# Patient Record
Sex: MALE | Race: WHITE | NOT HISPANIC OR LATINO | Employment: FULL TIME | URBAN - NONMETROPOLITAN AREA
[De-identification: names, ages, dates, MRNs, and addresses within clinical notes are randomized per-mention and may not be internally consistent; named-entity substitution may affect disease eponyms.]

---

## 2023-01-23 PROBLEM — C61 MALIGNANT NEOPLASM OF PROSTATE (MULTI): Status: ACTIVE | Noted: 2023-01-23

## 2023-01-23 PROBLEM — M19.072 PRIMARY OSTEOARTHRITIS OF BOTH ANKLES: Status: ACTIVE | Noted: 2023-01-23

## 2023-01-23 PROBLEM — Z90.79 STATUS POST PROSTATECTOMY: Status: ACTIVE | Noted: 2023-01-23

## 2023-01-23 PROBLEM — E78.5 HYPERLIPIDEMIA: Status: ACTIVE | Noted: 2023-01-23

## 2023-01-23 PROBLEM — M20.10 HALLUX VALGUS WITH BUNIONS: Status: ACTIVE | Noted: 2023-01-23

## 2023-01-23 PROBLEM — G93.0 ARACHNOID CYST: Status: ACTIVE | Noted: 2023-01-23

## 2023-01-23 PROBLEM — G47.33 OSA ON CPAP: Status: ACTIVE | Noted: 2023-01-23

## 2023-01-23 PROBLEM — M19.071 PRIMARY OSTEOARTHRITIS OF BOTH ANKLES: Status: ACTIVE | Noted: 2023-01-23

## 2023-01-23 PROBLEM — M19.079 1ST MTP ARTHRITIS: Status: ACTIVE | Noted: 2023-01-23

## 2023-01-23 PROBLEM — E66.3 OVERWEIGHT: Status: ACTIVE | Noted: 2023-01-23

## 2023-01-23 PROBLEM — M47.816 OSTEOARTHRITIS OF LUMBAR SPINE: Status: ACTIVE | Noted: 2023-01-23

## 2023-01-23 PROBLEM — E55.9 VITAMIN D DEFICIENCY: Status: ACTIVE | Noted: 2023-01-23

## 2023-01-23 PROBLEM — M79.89 SWELLING OF LOWER LEG: Status: ACTIVE | Noted: 2023-01-23

## 2023-01-23 PROBLEM — N39.3 MALE STRESS INCONTINENCE: Status: ACTIVE | Noted: 2023-01-23

## 2023-01-23 PROBLEM — M21.619 HALLUX VALGUS WITH BUNIONS: Status: ACTIVE | Noted: 2023-01-23

## 2023-01-23 PROBLEM — L24.9 IRRITANT CONTACT DERMATITIS: Status: ACTIVE | Noted: 2023-01-23

## 2023-01-23 PROBLEM — H53.9 VISION ABNORMALITIES: Status: ACTIVE | Noted: 2023-01-23

## 2023-01-23 PROBLEM — R60.0 BILATERAL EDEMA OF LOWER EXTREMITY: Status: ACTIVE | Noted: 2023-01-23

## 2023-01-23 PROBLEM — E66.01 SEVERE OBESITY (BMI 35.0-39.9) WITH COMORBIDITY (MULTI): Status: ACTIVE | Noted: 2023-01-23

## 2023-01-23 PROBLEM — M19.90 ARTHRITIS: Status: ACTIVE | Noted: 2023-01-23

## 2023-01-23 PROBLEM — I10 HYPERTENSION: Status: ACTIVE | Noted: 2023-01-23

## 2023-01-23 PROBLEM — R00.1 BRADYCARDIA: Status: ACTIVE | Noted: 2023-01-23

## 2023-01-23 PROBLEM — N52.34 ERECTILE DYSFUNCTION FOLLOWING SIMPLE PROSTATECTOMY: Status: ACTIVE | Noted: 2023-01-23

## 2023-01-23 RX ORDER — VIT C/E/CUPERIC/ZINC/LUTEIN 226-90-0.8
CAPSULE ORAL
COMMUNITY
Start: 2022-07-06

## 2023-01-23 RX ORDER — CHOLECALCIFEROL (VITAMIN D3) 25 MCG
1 TABLET ORAL DAILY
COMMUNITY
Start: 2021-01-28

## 2023-01-23 RX ORDER — MULTIVITAMIN
TABLET ORAL
COMMUNITY
Start: 2020-07-16 | End: 2023-01-23 | Stop reason: ENTERED-IN-ERROR

## 2023-01-23 RX ORDER — FUROSEMIDE 40 MG/1
1 TABLET ORAL DAILY
COMMUNITY
Start: 2022-06-28 | End: 2023-06-08 | Stop reason: SDUPTHER

## 2023-01-23 RX ORDER — ZINC OXIDE 25 %
PASTE (GRAM) TOPICAL
COMMUNITY
Start: 2022-06-08 | End: 2023-03-07 | Stop reason: ALTCHOICE

## 2023-01-23 RX ORDER — ETODOLAC 200 MG/1
1 CAPSULE ORAL DAILY
COMMUNITY
Start: 2019-09-20

## 2023-01-23 RX ORDER — LISINOPRIL 20 MG/1
1 TABLET ORAL DAILY
COMMUNITY
Start: 2022-06-28 | End: 2023-05-24

## 2023-01-23 RX ORDER — ATORVASTATIN CALCIUM 40 MG/1
1 TABLET, FILM COATED ORAL DAILY
COMMUNITY
Start: 2021-08-04 | End: 2023-03-07 | Stop reason: SDUPTHER

## 2023-03-07 ENCOUNTER — OFFICE VISIT (OUTPATIENT)
Dept: PRIMARY CARE | Facility: CLINIC | Age: 68
End: 2023-03-07
Payer: MEDICARE

## 2023-03-07 VITALS
HEIGHT: 68 IN | BODY MASS INDEX: 35.8 KG/M2 | RESPIRATION RATE: 18 BRPM | TEMPERATURE: 97.5 F | HEART RATE: 76 BPM | DIASTOLIC BLOOD PRESSURE: 72 MMHG | WEIGHT: 236.2 LBS | SYSTOLIC BLOOD PRESSURE: 145 MMHG | OXYGEN SATURATION: 97 %

## 2023-03-07 DIAGNOSIS — G93.0 ARACHNOID CYST: ICD-10-CM

## 2023-03-07 DIAGNOSIS — E55.9 VITAMIN D DEFICIENCY: ICD-10-CM

## 2023-03-07 DIAGNOSIS — Z00.00 HEALTH CARE MAINTENANCE: Primary | ICD-10-CM

## 2023-03-07 DIAGNOSIS — M19.072 PRIMARY OSTEOARTHRITIS OF BOTH ANKLES: ICD-10-CM

## 2023-03-07 DIAGNOSIS — C61 MALIGNANT NEOPLASM OF PROSTATE (MULTI): ICD-10-CM

## 2023-03-07 DIAGNOSIS — G47.33 OSA ON CPAP: ICD-10-CM

## 2023-03-07 DIAGNOSIS — I10 PRIMARY HYPERTENSION: ICD-10-CM

## 2023-03-07 DIAGNOSIS — M79.89 SWELLING OF LOWER LEG: ICD-10-CM

## 2023-03-07 DIAGNOSIS — M19.071 PRIMARY OSTEOARTHRITIS OF BOTH ANKLES: ICD-10-CM

## 2023-03-07 DIAGNOSIS — R60.0 BILATERAL EDEMA OF LOWER EXTREMITY: ICD-10-CM

## 2023-03-07 DIAGNOSIS — E78.5 HYPERLIPIDEMIA, UNSPECIFIED HYPERLIPIDEMIA TYPE: ICD-10-CM

## 2023-03-07 PROBLEM — M19.90 ARTHRITIS: Status: RESOLVED | Noted: 2023-01-23 | Resolved: 2023-03-07

## 2023-03-07 PROBLEM — M79.605 BILATERAL LEG AND FOOT PAIN: Status: RESOLVED | Noted: 2023-03-07 | Resolved: 2023-03-07

## 2023-03-07 PROBLEM — L24.9 IRRITANT CONTACT DERMATITIS: Status: RESOLVED | Noted: 2023-01-23 | Resolved: 2023-03-07

## 2023-03-07 PROBLEM — L03.116 CELLULITIS OF LEFT LOWER LEG: Status: RESOLVED | Noted: 2023-03-07 | Resolved: 2023-03-07

## 2023-03-07 PROBLEM — M79.671 BILATERAL LEG AND FOOT PAIN: Status: RESOLVED | Noted: 2023-03-07 | Resolved: 2023-03-07

## 2023-03-07 PROBLEM — M79.672 BILATERAL LEG AND FOOT PAIN: Status: RESOLVED | Noted: 2023-03-07 | Resolved: 2023-03-07

## 2023-03-07 PROBLEM — B35.3 TINEA PEDIS OF LEFT FOOT: Status: RESOLVED | Noted: 2023-03-07 | Resolved: 2023-03-07

## 2023-03-07 PROBLEM — M21.619 HALLUX VALGUS WITH BUNIONS: Status: RESOLVED | Noted: 2023-01-23 | Resolved: 2023-03-07

## 2023-03-07 PROBLEM — R00.1 BRADYCARDIA: Status: RESOLVED | Noted: 2023-01-23 | Resolved: 2023-03-07

## 2023-03-07 PROBLEM — R05.3 PERSISTENT DRY COUGH: Status: RESOLVED | Noted: 2023-03-07 | Resolved: 2023-03-07

## 2023-03-07 PROBLEM — E66.3 OVERWEIGHT: Status: RESOLVED | Noted: 2023-01-23 | Resolved: 2023-03-07

## 2023-03-07 PROBLEM — H53.9 VISION ABNORMALITIES: Status: RESOLVED | Noted: 2023-01-23 | Resolved: 2023-03-07

## 2023-03-07 PROBLEM — R21 BLISTERING ERUPTION: Status: RESOLVED | Noted: 2023-03-07 | Resolved: 2023-03-07

## 2023-03-07 PROBLEM — M19.079 1ST MTP ARTHRITIS: Status: RESOLVED | Noted: 2023-01-23 | Resolved: 2023-03-07

## 2023-03-07 PROBLEM — R21 RASH/SKIN ERUPTION: Status: RESOLVED | Noted: 2023-03-07 | Resolved: 2023-03-07

## 2023-03-07 PROBLEM — M20.10 HALLUX VALGUS WITH BUNIONS: Status: RESOLVED | Noted: 2023-01-23 | Resolved: 2023-03-07

## 2023-03-07 PROBLEM — M79.604 BILATERAL LEG AND FOOT PAIN: Status: RESOLVED | Noted: 2023-03-07 | Resolved: 2023-03-07

## 2023-03-07 PROCEDURE — 99214 OFFICE O/P EST MOD 30 MIN: CPT | Performed by: INTERNAL MEDICINE

## 2023-03-07 PROCEDURE — 99397 PER PM REEVAL EST PAT 65+ YR: CPT | Performed by: INTERNAL MEDICINE

## 2023-03-07 PROCEDURE — 1159F MED LIST DOCD IN RCRD: CPT | Performed by: INTERNAL MEDICINE

## 2023-03-07 PROCEDURE — 3078F DIAST BP <80 MM HG: CPT | Performed by: INTERNAL MEDICINE

## 2023-03-07 PROCEDURE — 3077F SYST BP >= 140 MM HG: CPT | Performed by: INTERNAL MEDICINE

## 2023-03-07 PROCEDURE — 1160F RVW MEDS BY RX/DR IN RCRD: CPT | Performed by: INTERNAL MEDICINE

## 2023-03-07 PROCEDURE — 1036F TOBACCO NON-USER: CPT | Performed by: INTERNAL MEDICINE

## 2023-03-07 RX ORDER — ATORVASTATIN CALCIUM 40 MG/1
40 TABLET, FILM COATED ORAL DAILY
Qty: 90 TABLET | Refills: 3 | Status: SHIPPED | OUTPATIENT
Start: 2023-03-07 | End: 2024-03-06

## 2023-03-07 ASSESSMENT — PAIN SCALES - GENERAL: PAINLEVEL: 0-NO PAIN

## 2023-03-07 NOTE — PROGRESS NOTES
Subjective   Patient ID:   Federico Thorpe is a 68 y.o. male, history of HTN, HLD, OA, hx of prostate cancer s/p prostatectomy, FLAKITA, who presents for ROUTINE FOLLOW UP     HTN  - on lisinopril 20 mg qd  - on furosemide 40 mg qd      HLD  - on atorvastatin 40 mg qd      Osteoarthritis, mostly the left ankles, occasionally the right as well   - last x-ray was in ~2012, pain unchanged since then   - on etodolac 200 mg to 400 mg PRN, typically qd      Hx of prostate cancer  - hx of adenocarcinoma of the prostate, diagnosed in 2008  - s/p prostatectomy on 8/5/2008, stage [id number], grade group 1, stage group IIa, rising PSA at the time  - s/p PET CT on 5/21: - no evidence of neoplastic occurrence on prostate bed  - patient was referred to Dr. Serrano, s/p salvage radiation at [facility], finished 3/2021  - patient getting 1 injection of Androgen Deprivation therapy - Eligard 45mg , s/p 1/28/21  - prior urologist quit - Dr. Chacon at [facility], currently followed by [facility] , last seen 2/2022, follow up in 6 months   - PSA has been normal since 2013  - currently off Tamsulosin 0.4 mg qd   - have been off oxybutynin, as per patient c/o dizziness and lightheadedness  - was referred to Dr. Jaime Samayoa for artificial urinary spincher, however patient declined the procedure     FLAKITA  - patient is on CPAP therapy at home  - currently have FLAKITA symptoms that improved , however appeared that patient have issues with CPAP machine,   - RVSP elevated on last echo 4/2021  - follows by sleep medicine, Dr. Jenn Meeks, haven't seen yet  - s/p sleep study 2/2023     All other (10) organ systems have been reviewed, negative for significant complaint and no change from baseline other than mentioned as per HPI above.    Patient Active Problem List   Diagnosis    Arachnoid cyst    Erectile dysfunction following simple prostatectomy    Hyperlipidemia    Hypertension    Male stress incontinence    Malignant neoplasm of prostate (CMS/HCC)     FLAKITA on CPAP    Osteoarthritis of lumbar spine    Primary osteoarthritis of both ankles    Status post prostatectomy    Severe obesity (BMI 35.0-39.9) with comorbidity (CMS/HCC)    Swelling of lower leg    Vitamin D deficiency        Past Surgical History:   Procedure Laterality Date    OTHER SURGICAL HISTORY  2020    Complete colonoscopy    OTHER SURGICAL HISTORY  2020    Ankle fracture repair    PROSTATE SURGERY  2018    Prostate Surgery       Family History   Problem Relation Name Age of Onset    Heart disease Mother      Hypertension Father      Heart disease Father       mother  at 53 for MI, dad with CHF and emphysema at 72 years of age, no DM in family, no known immediate family with malignancy     Social History    reports that he has never smoked. He has never used smokeless tobacco. He reports that he does not currently use alcohol. No history on file for drug use.  No Known Allergies    Medication Documentation Review Audit       Reviewed by Lo Al MD (Physician) on 23 at 1531      Medication Order Taking? Sig Documenting Provider Last Dose Status   atorvastatin (Lipitor) 40 mg tablet [id number] Yes Take 1 tablet (40 mg) by mouth once daily. Historical Provider, MD Taking Active   cholecalciferol (Vitamin D-3) 25 MCG (1000 UT) tablet [id number] Yes Take 1 tablet (25 mcg) by mouth once daily. Historical Provider, MD Taking Active   etodolac (Lodine) 200 mg capsule [id number] Yes Take 1 capsule (200 mg) by mouth once daily. Historical Provider, MD Taking Active   furosemide (Lasix) 40 mg tablet [id number] Yes Take 1 tablet (40 mg) by mouth once daily. Historical Provider, MD Taking Active   lisinopril 20 mg tablet [id number] Yes Take 1 tablet (20 mg) by mouth once daily. Historical Provider, MD Taking Active   MULTIVITAMIN ORAL [id number] Yes  Historical Provider, MD Taking Active   vit C-E-cupric-zinc-lutein (PreserVision Lutein) 226-90-0.8-5 mg capsule capsule [id number] No TAKE AS  "DIRECTED. Vinicius Provider, MD Not Taking Active   zinc oxide 25 % paste [id number] No Use 3 to 4 times a day for both feet, keep feet dry Historical Provider, MD Not Taking Active   zinc oxide-cod liver oil 40 % ointment [id number] No Use 3 (THREE) to FOUR times a day for both feet, keep feet dry Historical Provider, MD Not Taking Active                     Objective       6/21/2022     2:39 PM 6/28/2022     2:39 PM 7/6/2022     3:46 PM 8/4/2022     3:18 PM 8/8/2022     1:23 PM 11/7/2022     3:29 PM 3/7/2023     3:10 PM   Vitals   Systolic 132 148 160 151 148 152 150   Diastolic 70 60 80 79 80 74 92   Heart Rate 83 83 69 60 97 73 76   Temp 36.6 °C (97.8 °F) 35.7 °C (96.3 °F) 35.6 °C (96 °F)  36.2 °C (97.2 °F) 36.6 °C (97.8 °F) 36.4 °C (97.5 °F)   Resp 18 18    18 18   Height (in) 1.727 m (5' 8\") 1.727 m (5' 8\")  1.727 m (5' 8\") 1.727 m (5' 8\") 1.727 m (5' 8\") 1.727 m (5' 8\")   Weight (lb) 236.5 239.5 242 236.99 241 239 236.2   BMI 35.96 kg/m2 36.42 kg/m2 36.8 kg/m2 36.03 kg/m2 36.64 kg/m2 36.34 kg/m2 35.91 kg/m2   BSA (m2) 2.27 m2 2.29 m2 2.3 m2 2.27 m2 2.29 m2 2.28 m2 2.27 m2   Visit Report       Report     Physical Exam  Constitutional:       General: He is not in acute distress.     Appearance: Normal appearance. He is not ill-appearing or toxic-appearing.   HENT:      Head: Normocephalic.   Eyes:      Extraocular Movements: Extraocular movements intact.      Conjunctiva/sclera: Conjunctivae normal.   Cardiovascular:      Rate and Rhythm: Normal rate and regular rhythm.      Heart sounds: Normal heart sounds. No murmur heard.     No friction rub. No gallop.   Pulmonary:      Effort: Pulmonary effort is normal. No respiratory distress.      Breath sounds: Normal breath sounds. No stridor. No wheezing, rhonchi or rales.   Abdominal:      General: Abdomen is flat. There is no distension.      Palpations: Abdomen is soft.      Tenderness: There is no abdominal tenderness. There is no guarding.   Musculoskeletal:        "  General: Normal range of motion.   Skin:     General: Skin is warm and dry.   Neurological:      General: No focal deficit present.      Mental Status: He is alert. Mental status is at baseline.         Assessment/Plan     Federico Thorpe is a 68 y.o. male, history of HTN, HLD, OA, hx of prostate cancer s/p prostatectomy, FLAKITA, who presents for ROUTINE FOLLOW UP     Problem List Items Addressed This Visit          Nervous    Arachnoid cyst     - CT scan in 7/2021 with stable cyst, unchanged         LFAKITA on CPAP     Advised to follow up with sleep medicine Dr. Jenn Meeks, s/p sleep study on 2/2023 with [facility]             Circulatory    Hypertension     - c/w lisinopril 20 mg qd  - c/w furosemide 40 mg qd             Relevant Orders    CBC and Auto Differential    Basic metabolic panel       Genitourinary    Malignant neoplasm of prostate (CMS/HCC)     - s/p Androgen Deprivation therapy - Eligard 45mg , s/p 1/28/21  - f/u urologist, Dr. Sepulveda , next PSA due 7/2023  - last seen 2/2023            Musculoskeletal    Primary osteoarthritis of both ankles     - can c/w Etodolac 200 mg to 400 mg PRN  - likely secondary to worsening arthritis, if worsening can consider repeat x-ray, however the pain hasn't changed significantly as per patient         Swelling of lower leg     - echo was 65-70% EF on 4/2021, mild RVSP elevation 40 .5 mg   - noted mild edema   - BNP normal on 7/2022         RESOLVED: Bilateral edema of lower extremity    Relevant Orders    CBC and Auto Differential    Basic metabolic panel       Endocrine/Metabolic    Vitamin D deficiency    Relevant Orders    Vitamin D 25-Hydroxy,Total       Other    Hyperlipidemia      c/w atorvastatin 40 mg qd          Relevant Medications    atorvastatin (Lipitor) 40 mg tablet     Other Visit Diagnoses       Health care maintenance    -  Primary          Health Care Maintenance: MWV 5/4/22, Physical 3/7/23  Aspirin for primary/secondary prophylaxis: ASCVD  of 15.5% consider asa if desired next visit   Diabetes Screening/monitoring : ordered BMP  Lipid screening:   Lab Results   Component Value Date    CHOL 130 11/21/2022    CHOL 142 05/14/2022    CHOL 177 07/17/2021     Lab Results   Component Value Date    HDL 54.2 11/21/2022    HDL 51.0 05/14/2022    HDL 58.1 07/17/2021     No results found for: LDLCALC  Lab Results   Component Value Date    TRIG 52 11/21/2022    TRIG 45 05/14/2022    TRIG 49 07/17/2021     No components found for: CHOLHDL    STD/HIV Screening: declined   Lung Cancer Screening: never smoker   Hepatitis C Screening: hepatitis C negative 4/2021  PSA discussion:    Lab Results   Component Value Date    PSA <0.10 01/18/2023    PSA <0.10 07/12/2022    PSA <0.10 01/15/2022       Will defer to urologist for next PSA  AAA Screening: never smoker  Colonoscopy: last colonoscopy was 4/2014, repeat 4/2024  Vaccination: s/p COVID shots including bivalent , s/p PCV 2017, s/p PPSV23 2014 and 2021, s/p flu shot 9/2022 , s/p shingrix x2, Tdap 2021     Follow up in 6 months, earlier if noted abnormality on lab

## 2023-03-07 NOTE — ASSESSMENT & PLAN NOTE
- s/p Androgen Deprivation therapy - Eligard 45mg , s/p 1/28/21  - f/u urologist, Dr. Sepulveda , next PSA due 7/2023  - last seen 2/2023

## 2023-03-07 NOTE — ASSESSMENT & PLAN NOTE
- echo was 65-70% EF on 4/2021, mild RVSP elevation 40 .5 mg   - noted mild edema   - BNP normal on 7/2022

## 2023-03-08 NOTE — ASSESSMENT & PLAN NOTE
- can c/w Etodolac 200 mg to 400 mg PRN  - likely secondary to worsening arthritis, if worsening can consider repeat x-ray, however the pain hasn't changed significantly as per patient

## 2023-03-08 NOTE — ASSESSMENT & PLAN NOTE
Advised to follow up with sleep medicine Dr. Jenn Meeks, s/p sleep study on 2/2023 with [facility]

## 2023-03-09 DIAGNOSIS — G47.33 OSA ON CPAP: Primary | ICD-10-CM

## 2023-03-24 ENCOUNTER — LAB (OUTPATIENT)
Dept: LAB | Facility: LAB | Age: 68
End: 2023-03-24
Payer: MEDICARE

## 2023-03-24 DIAGNOSIS — I10 PRIMARY HYPERTENSION: ICD-10-CM

## 2023-03-24 DIAGNOSIS — E55.9 VITAMIN D DEFICIENCY: ICD-10-CM

## 2023-03-24 DIAGNOSIS — R60.0 BILATERAL EDEMA OF LOWER EXTREMITY: ICD-10-CM

## 2023-03-24 LAB
ANION GAP IN SER/PLAS: 12 MMOL/L (ref 10–20)
BASOPHILS (10*3/UL) IN BLOOD BY AUTOMATED COUNT: 0.03 X10E9/L (ref 0–0.1)
BASOPHILS/100 LEUKOCYTES IN BLOOD BY AUTOMATED COUNT: 0.6 % (ref 0–2)
CALCIUM (MG/DL) IN SER/PLAS: 9.2 MG/DL (ref 8.6–10.3)
CARBON DIOXIDE, TOTAL (MMOL/L) IN SER/PLAS: 29 MMOL/L (ref 21–32)
CHLORIDE (MMOL/L) IN SER/PLAS: 105 MMOL/L (ref 98–107)
CREATININE (MG/DL) IN SER/PLAS: 0.8 MG/DL (ref 0.5–1.3)
EOSINOPHILS (10*3/UL) IN BLOOD BY AUTOMATED COUNT: 0.11 X10E9/L (ref 0–0.7)
EOSINOPHILS/100 LEUKOCYTES IN BLOOD BY AUTOMATED COUNT: 2.3 % (ref 0–6)
ERYTHROCYTE DISTRIBUTION WIDTH (RATIO) BY AUTOMATED COUNT: 13 % (ref 11.5–14.5)
ERYTHROCYTE MEAN CORPUSCULAR HEMOGLOBIN CONCENTRATION (G/DL) BY AUTOMATED: 33.2 G/DL (ref 32–36)
ERYTHROCYTE MEAN CORPUSCULAR VOLUME (FL) BY AUTOMATED COUNT: 94 FL (ref 80–100)
ERYTHROCYTES (10*6/UL) IN BLOOD BY AUTOMATED COUNT: 4.93 X10E12/L (ref 4.5–5.9)
GFR MALE: >90 ML/MIN/1.73M2
GLUCOSE (MG/DL) IN SER/PLAS: 81 MG/DL (ref 74–99)
HEMATOCRIT (%) IN BLOOD BY AUTOMATED COUNT: 46.1 % (ref 41–52)
HEMOGLOBIN (G/DL) IN BLOOD: 15.3 G/DL (ref 13.5–17.5)
IMMATURE GRANULOCYTES/100 LEUKOCYTES IN BLOOD BY AUTOMATED COUNT: 0.6 % (ref 0–0.9)
LEUKOCYTES (10*3/UL) IN BLOOD BY AUTOMATED COUNT: 4.8 X10E9/L (ref 4.4–11.3)
LYMPHOCYTES (10*3/UL) IN BLOOD BY AUTOMATED COUNT: 1.01 X10E9/L (ref 1.2–4.8)
LYMPHOCYTES/100 LEUKOCYTES IN BLOOD BY AUTOMATED COUNT: 21 % (ref 13–44)
MONOCYTES (10*3/UL) IN BLOOD BY AUTOMATED COUNT: 0.58 X10E9/L (ref 0.1–1)
MONOCYTES/100 LEUKOCYTES IN BLOOD BY AUTOMATED COUNT: 12.1 % (ref 2–10)
NEUTROPHILS (10*3/UL) IN BLOOD BY AUTOMATED COUNT: 3.04 X10E9/L (ref 1.2–7.7)
NEUTROPHILS/100 LEUKOCYTES IN BLOOD BY AUTOMATED COUNT: 63.4 % (ref 40–80)
PLATELETS (10*3/UL) IN BLOOD AUTOMATED COUNT: 170 X10E9/L (ref 150–450)
POTASSIUM (MMOL/L) IN SER/PLAS: 4.5 MMOL/L (ref 3.5–5.3)
SODIUM (MMOL/L) IN SER/PLAS: 141 MMOL/L (ref 136–145)
UREA NITROGEN (MG/DL) IN SER/PLAS: 18 MG/DL (ref 6–23)

## 2023-03-24 PROCEDURE — 85025 COMPLETE CBC W/AUTO DIFF WBC: CPT

## 2023-03-24 PROCEDURE — 80048 BASIC METABOLIC PNL TOTAL CA: CPT

## 2023-03-24 PROCEDURE — 36415 COLL VENOUS BLD VENIPUNCTURE: CPT

## 2023-03-24 PROCEDURE — 82306 VITAMIN D 25 HYDROXY: CPT

## 2023-03-25 LAB — CALCIDIOL (25 OH VITAMIN D3) (NG/ML) IN SER/PLAS: 45 NG/ML

## 2023-05-23 DIAGNOSIS — I10 PRIMARY HYPERTENSION: Primary | ICD-10-CM

## 2023-05-24 RX ORDER — LISINOPRIL 20 MG/1
TABLET ORAL
Qty: 90 TABLET | Refills: 3 | Status: SHIPPED | OUTPATIENT
Start: 2023-05-24 | End: 2023-06-08 | Stop reason: SDUPTHER

## 2023-06-08 DIAGNOSIS — I10 PRIMARY HYPERTENSION: ICD-10-CM

## 2023-06-09 RX ORDER — LISINOPRIL 20 MG/1
20 TABLET ORAL DAILY
Qty: 90 TABLET | Refills: 3 | Status: SHIPPED | OUTPATIENT
Start: 2023-06-09

## 2023-06-09 RX ORDER — FUROSEMIDE 40 MG/1
40 TABLET ORAL DAILY
Qty: 30 TABLET | Refills: 1 | Status: SHIPPED | OUTPATIENT
Start: 2023-06-09 | End: 2023-06-26

## 2023-06-17 LAB — PROSTATE SPECIFIC AG (NG/ML) IN SER/PLAS: <0.1 NG/ML (ref 0–4)

## 2023-06-24 DIAGNOSIS — I10 PRIMARY HYPERTENSION: ICD-10-CM

## 2023-06-26 RX ORDER — FUROSEMIDE 40 MG/1
TABLET ORAL
Qty: 100 TABLET | Refills: 2 | Status: SHIPPED | OUTPATIENT
Start: 2023-06-26

## 2023-09-07 ENCOUNTER — APPOINTMENT (OUTPATIENT)
Dept: PRIMARY CARE | Facility: CLINIC | Age: 68
End: 2023-09-07
Payer: MEDICARE

## 2023-12-30 ENCOUNTER — LAB (OUTPATIENT)
Dept: LAB | Facility: LAB | Age: 68
End: 2023-12-30
Payer: MEDICARE

## 2023-12-30 DIAGNOSIS — C61 MALIGNANT NEOPLASM OF PROSTATE (MULTI): Primary | ICD-10-CM

## 2023-12-30 LAB — PSA SERPL-MCNC: <0.1 NG/ML

## 2023-12-30 PROCEDURE — 84153 ASSAY OF PSA TOTAL: CPT

## 2023-12-30 PROCEDURE — 36415 COLL VENOUS BLD VENIPUNCTURE: CPT

## 2024-01-10 ENCOUNTER — OFFICE VISIT (OUTPATIENT)
Dept: UROLOGY | Facility: CLINIC | Age: 69
End: 2024-01-10
Payer: MEDICARE

## 2024-01-10 DIAGNOSIS — C61 MALIGNANT NEOPLASM OF PROSTATE (MULTI): ICD-10-CM

## 2024-01-10 PROCEDURE — 99212 OFFICE O/P EST SF 10 MIN: CPT | Performed by: STUDENT IN AN ORGANIZED HEALTH CARE EDUCATION/TRAINING PROGRAM

## 2024-01-10 PROCEDURE — 1126F AMNT PAIN NOTED NONE PRSNT: CPT | Performed by: STUDENT IN AN ORGANIZED HEALTH CARE EDUCATION/TRAINING PROGRAM

## 2024-01-10 PROCEDURE — 1159F MED LIST DOCD IN RCRD: CPT | Performed by: STUDENT IN AN ORGANIZED HEALTH CARE EDUCATION/TRAINING PROGRAM

## 2024-01-10 PROCEDURE — 1160F RVW MEDS BY RX/DR IN RCRD: CPT | Performed by: STUDENT IN AN ORGANIZED HEALTH CARE EDUCATION/TRAINING PROGRAM

## 2024-01-10 PROCEDURE — 1036F TOBACCO NON-USER: CPT | Performed by: STUDENT IN AN ORGANIZED HEALTH CARE EDUCATION/TRAINING PROGRAM

## 2024-01-10 RX ORDER — LISINOPRIL AND HYDROCHLOROTHIAZIDE 12.5; 2 MG/1; MG/1
1 TABLET ORAL DAILY
COMMUNITY
Start: 2023-11-11

## 2024-01-10 RX ORDER — DULOXETIN HYDROCHLORIDE 60 MG/1
60 CAPSULE, DELAYED RELEASE ORAL DAILY
COMMUNITY

## 2024-01-10 RX ORDER — LEUPROLIDE ACETATE 45 MG
KIT SUBCUTANEOUS
COMMUNITY
Start: 2021-01-28

## 2024-01-10 NOTE — PROGRESS NOTES
Subjective   Patient ID: Federico Thorpe is a 68 y.o. male.    HPI  67 yo male with prostate cancer, was diagnosed in 2008 by Dr. Brasher, for which he underwent a prostatectomy, GG1, Jeff score 3+3=6, on path. He completed XRT, and received one ADT injection for a BCR years later. F/U stress urinary incontinence, tried oxybutynin for 2 weeks, but stopped due to dizziness.      Was previously referred to Dr. Samayoa for discussion of artificial sphincter, but did not follow through with consultation.     He is managing his urinary symptoms with behavioral modifications and caffein restriction, which resulted in significant improvement of symptoms to an acceptable extent. He wears 2 pads a day, they do not get soaked, and this is manageable for him. He can live with this.     Lab Results   Component Value Date    PSA <0.10 12/30/2023    PSA <0.10 06/17/2023    PSA <0.10 01/18/2023    PSA <0.10 07/12/2022    PSA <0.10 01/15/2022    PSA <0.10 10/14/2021         Review of Systems    Objective   Physical Exam    Assessment/Plan   67 yo male with Fithian 6 prostate cancer, is s/p prostatectomy in 2008. He completed XRT, and received one ADT injection. PSA remains undetectable.      Stress urinary incontinence since RALP, typically using 1 ppd, Previously referred to Dr. Jaime Samayoa for discussion of artificial urinary sphincter. Did not follow through with this.      He stopped the oxybutynin due to dizziness. His urinary symptoms are managed with behavioral modifications. For now, he declines the need for further consultation. PSA in 6 months.     Plan:   PSA 6 months.   FUV 6 months.   Diagnoses and all orders for this visit:  Malignant neoplasm of prostate (CMS/[facility])  -     PSA; Future

## 2024-07-10 ENCOUNTER — LAB (OUTPATIENT)
Dept: LAB | Facility: LAB | Age: 69
End: 2024-07-10
Payer: MEDICARE

## 2024-07-10 DIAGNOSIS — C61 MALIGNANT NEOPLASM OF PROSTATE (MULTI): ICD-10-CM

## 2024-07-10 PROCEDURE — 84153 ASSAY OF PSA TOTAL: CPT

## 2024-07-10 PROCEDURE — 36415 COLL VENOUS BLD VENIPUNCTURE: CPT

## 2024-07-11 LAB — PSA SERPL-MCNC: <0.1 NG/ML

## 2024-07-24 ENCOUNTER — APPOINTMENT (OUTPATIENT)
Dept: UROLOGY | Facility: CLINIC | Age: 69
End: 2024-07-24
Payer: MEDICARE

## 2024-07-24 DIAGNOSIS — C61 MALIGNANT NEOPLASM OF PROSTATE (MULTI): Primary | ICD-10-CM

## 2024-07-24 DIAGNOSIS — N39.3 MALE STRESS INCONTINENCE: ICD-10-CM

## 2024-07-24 DIAGNOSIS — N52.31 ERECTILE DYSFUNCTION AFTER RADICAL PROSTATECTOMY: ICD-10-CM

## 2024-07-24 PROCEDURE — 99214 OFFICE O/P EST MOD 30 MIN: CPT | Performed by: STUDENT IN AN ORGANIZED HEALTH CARE EDUCATION/TRAINING PROGRAM

## 2024-07-24 PROCEDURE — G2211 COMPLEX E/M VISIT ADD ON: HCPCS | Performed by: STUDENT IN AN ORGANIZED HEALTH CARE EDUCATION/TRAINING PROGRAM

## 2024-07-24 RX ORDER — TADALAFIL 5 MG/1
5 TABLET ORAL DAILY
Qty: 30 TABLET | Refills: 11 | Status: SHIPPED | OUTPATIENT
Start: 2024-07-24 | End: 2025-07-24

## 2024-07-24 NOTE — PROGRESS NOTES
Subjective   Patient ID: Federico Thorpe is a 69 y.o. male.    HPI  67 yo male with prostate cancer, was diagnosed in 2008 by Dr. Brasher, for which he underwent a prostatectomy, GG1, Jeff score 3+3=6, on path. He completed XRT, and received one ADT injection for a BCR years later. PSA undetectable. F/U stress urinary incontinence, tried oxybutynin for 2 weeks, but stopped due to dizziness.      Was previously referred to Dr. Samayoa for discussion of artificial sphincter, but did not follow through with consultation.     He wants to start considering artifical sphincter again. He is using 5 pads a day. Symptoms worse after radiation.     He is having issues with ED. This is advances, has been ongoing for some time.     Lab Results   Component Value Date    PSA <0.10 07/10/2024    PSA <0.10 12/30/2023    PSA <0.10 06/17/2023    PSA <0.10 01/18/2023    PSA <0.10 07/12/2022    PSA <0.10 01/15/2022       Review of Systems  A complete review of systems was performed. All systems are noted to be negative unless indicated in the history of present illness, impression, active problem list, or past histories.     Objective   Physical Exam    Assessment/Plan   67 yo male with prostate cancer, was diagnosed in 2008 by Dr. Brasher, for which he underwent a prostatectomy, GG1, Jeff score 3+3=6, on path. He completed XRT, and received one ADT injection for a BCR years later. PSA undetectable. F/U stress urinary incontinence, tried oxybutynin for 2 weeks, but stopped due to dizziness.      Was previously referred to Dr. Samayoa for discussion of artificial sphincter, but did not follow through with consultation.       He is having issues with ED. This is advances, has been ongoing for some time.     He wants to start considering artifical sphincter again. He is using 5 pads a day. Notes sxs are worse after radiation. Will place referral again to Dr. Samayoa for discussion.     He will also discuss his ED symptoms with him. In the  meantime, we will add Cialis 5 mg daily. Risks, benefits, alternatives and side effects discussed.     Plan:  Referral to Dr. Samayoa to discuss artifical sphincter.   Start Cialis 5 mg 1 pill daily.   PSA January 2025.   FUV January 2025.     Diagnoses and all orders for this visit:  Malignant neoplasm of prostate (Multi)  -     tadalafil (Cialis) 5 mg tablet; Take 1 tablet (5 mg) by mouth once daily.  -     Prostate Specific Antigen; Future  Male stress incontinence  -     Referral to Urology; Future  Erectile dysfunction after radical prostatectomy  -     Referral to Urology; Future    Scribe Attestation  By signing my name below, IEun Scribe attest that this documentation has been prepared under the direction and in the presence of Holly Sepulveda MD.

## 2024-08-06 ENCOUNTER — OFFICE VISIT (OUTPATIENT)
Dept: UROLOGY | Facility: HOSPITAL | Age: 69
End: 2024-08-06
Payer: MEDICARE

## 2024-08-06 DIAGNOSIS — N52.31 ERECTILE DYSFUNCTION AFTER RADICAL PROSTATECTOMY: ICD-10-CM

## 2024-08-06 DIAGNOSIS — C61 MALIGNANT NEOPLASM OF PROSTATE (MULTI): ICD-10-CM

## 2024-08-06 DIAGNOSIS — N52.34 ERECTILE DYSFUNCTION FOLLOWING SIMPLE PROSTATECTOMY: ICD-10-CM

## 2024-08-06 DIAGNOSIS — N39.3 MALE STRESS INCONTINENCE: ICD-10-CM

## 2024-08-06 DIAGNOSIS — N40.0 BENIGN PROSTATIC HYPERPLASIA, UNSPECIFIED WHETHER LOWER URINARY TRACT SYMPTOMS PRESENT: Primary | ICD-10-CM

## 2024-08-06 LAB
POC APPEARANCE, URINE: CLEAR
POC BILIRUBIN, URINE: NEGATIVE
POC BLOOD, URINE: ABNORMAL
POC COLOR, URINE: YELLOW
POC GLUCOSE, URINE: NEGATIVE MG/DL
POC KETONES, URINE: NEGATIVE MG/DL
POC LEUKOCYTES, URINE: ABNORMAL
POC NITRITE,URINE: NEGATIVE
POC PH, URINE: 5.5 PH
POC PROTEIN, URINE: ABNORMAL MG/DL
POC SPECIFIC GRAVITY, URINE: 1.02
POC UROBILINOGEN, URINE: 0.2 EU/DL

## 2024-08-06 PROCEDURE — 81003 URINALYSIS AUTO W/O SCOPE: CPT | Mod: QW | Performed by: UROLOGY

## 2024-08-06 PROCEDURE — 51798 US URINE CAPACITY MEASURE: CPT | Performed by: UROLOGY

## 2024-08-06 PROCEDURE — 99214 OFFICE O/P EST MOD 30 MIN: CPT | Performed by: UROLOGY

## 2024-08-06 NOTE — PROGRESS NOTES
NPV     HISTORY OF PRESENT ILLNESS:   Federico Thorpe is a 69 y.o. male who is being seen today for consultation    69 yo male with prostate cancer, was diagnosed in 2008 by Dr. Brasher, for which he underwent a prostatectomy, GG1, Jeff score 3+3=6, on path. He completed XRT, and received one ADT injection for a BCR years later. PSA undetectable. F/U stress urinary incontinence, tried oxybutynin for 2 weeks, but stopped due to dizziness.      HPI:   Erectile Dysfunction:  Duration - 4 Years  Able to obtain - Yes  Able to maintain - No  Pain - No  Curvature - No N/A  Libido - Good  Prior treatments - Tadalafil- not very effective     Relationship status -   Sexual Partners - Female     Voiding Symptoms  Frequency: Q 1 hours   Urgency: No  Urge Incontinence: Yes  Nocturia: 1 times per night Sleep Disorder: No    Stream: mild  Hesitancy: No  Straining: No  Intermittency: No  Sensation of Incomplete Emptying: No    Stress Incontinence: Yes  Pads:  Yes 5per day     Dysuria:  No  Gross Hematuria:  No  UTI:  Yes- in past   Stones:  No    Consumes 30-40 oz of fluid per day.     No medications for his bladder in the past.      No urodynamic testing in the past.     Bowel Function:   Pt has no constipation. BM's Q 1 days.    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    Arthritis 01/23/2023    Bilateral edema of lower extremity 01/23/2023    Bilateral leg and foot pain 03/07/2023    Blistering eruption 03/07/2023    Cellulitis of left lower leg 03/07/2023    Hallux valgus with bunions 01/23/2023    Irritant contact dermatitis 01/23/2023    Obesity, unspecified 05/04/2022    Class 2 obesity with body mass index (BMI) of 37.0 to 37.9 in adult    Overweight 01/23/2023    Persistent dry cough 03/07/2023    Personal history of diseases of the blood and blood-forming organs and certain disorders involving the immune mechanism 10/10/2013    History of anemia    Personal history of other diseases  of the nervous system and sense organs 07/08/2019    History of impacted cerumen    Rash/skin eruption 03/07/2023    Tinea pedis of left foot 03/07/2023    Vision abnormalities 01/23/2023     Past Surgical History:   Procedure Laterality Date    OTHER SURGICAL HISTORY  04/16/2020    Complete colonoscopy    OTHER SURGICAL HISTORY  04/16/2020    Ankle fracture repair    PROSTATE SURGERY  01/31/2018    Prostate Surgery     Social History     Tobacco Use    Smoking status: Never    Smokeless tobacco: Never    Tobacco comments:     never smoker, little etoh , no illicit drugs    Substance Use Topics    Alcohol use: Not Currently     Family History   Problem Relation Name Age of Onset    Heart disease Mother      Hypertension Father      Heart disease Father       [unfilled]  Current Outpatient Medications on File Prior to Visit   Medication Sig Dispense Refill    atorvastatin (Lipitor) 40 mg tablet Take 1 tablet (40 mg) by mouth once daily. 90 tablet 3    cholecalciferol (Vitamin D-3) 25 MCG (1000 UT) tablet Take 1 tablet (25 mcg) by mouth once daily.      DULoxetine (Cymbalta) 60 mg DR capsule Take 1 capsule (60 mg) by mouth once daily.      etodolac (Lodine) 200 mg capsule Take 1 capsule (200 mg) by mouth once daily.      furosemide (Lasix) 40 mg tablet TAKE 1 TABLET BY MOUTH DAILY AS  DIRECTED 100 tablet 2    leuprolide, 6 month, 45 mg injection Inject under the skin.      lisinopril 20 mg tablet Take 1 tablet (20 mg) by mouth once daily. (Patient not taking: Reported on 1/10/2024) 90 tablet 3    lisinopriL-hydrochlorothiazide 20-12.5 mg tablet Take 1 tablet by mouth once daily.      MULTIVITAMIN ORAL       tadalafil (Cialis) 5 mg tablet Take 1 tablet (5 mg) by mouth once daily. 30 tablet 11    vit C-E-cupric-zinc-lutein (PreserVision Lutein) 226-90-0.8-5 mg capsule capsule TAKE AS DIRECTED.       No current facility-administered medications on file prior to visit.     Federico has No Known Allergies.     Review of  Systems    14 point ROS reviewed and discussed with the patient. Pertinent positives/negatives discussed in the History of Present Illness (HPI).      FH:  Prostate CA: Yes- self   Bladder CA: No  Kidney CA: No  Stones: No    Social History  Occupation:    Tob: No  Etoh: Yes- not very often       Objective:   Physical Exam   1. Constitutional: NAD, Well-developed, Well-nourished  2. Respiratory: Unlabored breathing, no audible wheezes, no use of accessory muscles   3. Cardiovascular: No JVD, extremities perfused, no edema  4. Abdomen: Soft, non-tender, non-distended, no masses or hernia.  No CVA tenderness.    5. Skin: no visible lesions, plaque, rashes, jaundice  6. Neuro: Gait normal, no focal neurologic deficit  7. Psychiatric: Mood and affect normal and appropriate, alert and oriented    Labs  Lab Results   Component Value Date    TESTOSTERONE 436 01/15/2022    TESTOSTERONE <60 (L) 10/14/2021    TESTOSTERONE <60 (L) 07/17/2021     Lab Results   Component Value Date    PSA <0.10 07/10/2024     Lab Results   Component Value Date    GFRMALE >90 03/24/2023     Lab Results   Component Value Date    CREATININE 0.80 03/24/2023     Lab Results   Component Value Date    CHOL 130 11/21/2022     Lab Results   Component Value Date    HDL 54.2 11/21/2022     Lab Results   Component Value Date    CHHDL 2.4 11/21/2022     Lab Results   Component Value Date    LDLF 65 11/21/2022     Lab Results   Component Value Date    VLDL 10 11/21/2022     Lab Results   Component Value Date    TRIG 52 11/21/2022     Lab Results   Component Value Date    HGBA1C 5.4 10/23/2020     Lab Results   Component Value Date    TESTF 1.8 (L) 04/12/2021     Lab Results   Component Value Date    HCT 46.1 03/24/2023     PVR: 0ml    Assessment/Plan:   Federico Thorpe presents with       1. Erectile dysfunction following simple prostatectomy    2. Male stress incontinence    3. Erectile dysfunction after radical prostatectomy    4. Malignant  neoplasm of prostate (Multi)        Will schedule for cystoscopy to r/o any urethral pathology.  Likely headed towards [facility]US.      Plan to treat KATE first then ED.

## 2024-08-26 ENCOUNTER — APPOINTMENT (OUTPATIENT)
Dept: UROLOGY | Facility: HOSPITAL | Age: 69
End: 2024-08-26
Payer: MEDICARE

## 2024-08-26 DIAGNOSIS — N40.0 BENIGN PROSTATIC HYPERPLASIA WITHOUT LOWER URINARY TRACT SYMPTOMS: Primary | ICD-10-CM

## 2024-08-26 DIAGNOSIS — C61 MALIGNANT NEOPLASM OF PROSTATE (MULTI): ICD-10-CM

## 2024-08-26 DIAGNOSIS — N39.3 MALE STRESS INCONTINENCE: ICD-10-CM

## 2024-08-26 DIAGNOSIS — R79.1 ABNORMAL COAGULATION PROFILE: ICD-10-CM

## 2024-08-26 DIAGNOSIS — R94.5 ABNORMAL RESULTS OF LIVER FUNCTION STUDIES: ICD-10-CM

## 2024-08-26 LAB
POC APPEARANCE, URINE: CLEAR
POC BILIRUBIN, URINE: NEGATIVE
POC BLOOD, URINE: ABNORMAL
POC COLOR, URINE: YELLOW
POC GLUCOSE, URINE: NEGATIVE MG/DL
POC KETONES, URINE: NEGATIVE MG/DL
POC LEUKOCYTES, URINE: NEGATIVE
POC NITRITE,URINE: NEGATIVE
POC PH, URINE: 7 PH
POC PROTEIN, URINE: NEGATIVE MG/DL
POC SPECIFIC GRAVITY, URINE: 1.02
POC UROBILINOGEN, URINE: 0.2 EU/DL

## 2024-08-26 PROCEDURE — 99214 OFFICE O/P EST MOD 30 MIN: CPT | Mod: 25 | Performed by: UROLOGY

## 2024-08-26 PROCEDURE — 52000 CYSTOURETHROSCOPY: CPT | Performed by: UROLOGY

## 2024-08-26 PROCEDURE — 99214 OFFICE O/P EST MOD 30 MIN: CPT | Performed by: UROLOGY

## 2024-08-26 PROCEDURE — 81003 URINALYSIS AUTO W/O SCOPE: CPT | Mod: QW | Performed by: UROLOGY

## 2024-08-26 RX ORDER — SODIUM CHLORIDE, SODIUM LACTATE, POTASSIUM CHLORIDE, CALCIUM CHLORIDE 600; 310; 30; 20 MG/100ML; MG/100ML; MG/100ML; MG/100ML
20 INJECTION, SOLUTION INTRAVENOUS CONTINUOUS
OUTPATIENT
Start: 2024-08-26

## 2024-08-26 RX ORDER — VANCOMYCIN HYDROCHLORIDE 1 G/200ML
1000 INJECTION, SOLUTION INTRAVENOUS ONCE
OUTPATIENT
Start: 2024-08-26 | End: 2024-08-26

## 2024-08-26 RX ORDER — ACETAMINOPHEN 325 MG/1
650 TABLET ORAL ONCE
OUTPATIENT
Start: 2024-08-26 | End: 2024-08-26

## 2024-08-26 RX ORDER — CHLORHEXIDINE GLUCONATE 40 MG/ML
SOLUTION TOPICAL DAILY PRN
OUTPATIENT
Start: 2024-08-26

## 2024-08-26 RX ORDER — FLUCONAZOLE 2 MG/ML
200 INJECTION, SOLUTION INTRAVENOUS ONCE
OUTPATIENT
Start: 2024-08-26 | End: 2024-08-26

## 2024-08-26 RX ORDER — GABAPENTIN 300 MG/1
600 CAPSULE ORAL ONCE
OUTPATIENT
Start: 2024-08-26 | End: 2024-08-26

## 2024-08-26 NOTE — PROGRESS NOTES
[facility]     HISTORY OF PRESENT ILLNESS:   Federico Thorpe is a 69 y.o. male who is being seen today for cystoscopy    67 yo male with prostate cancer, was diagnosed in 2008 by Dr. Brasher, for which he underwent a prostatectomy, GG1, Jeff score 3+3=6, on path. He completed XRT, and received one ADT injection for a BCR years later. PSA undetectable. F/U stress urinary incontinence, tried oxybutynin for 2 weeks, but stopped due to dizziness.      HPI:   Erectile Dysfunction:  Duration - 4 Years  Able to obtain - Yes  Able to maintain - No  Pain - No  Curvature - No N/A  Libido - Good  Prior treatments - Tadalafil- not very effective     Relationship status -   Sexual Partners - Female     Voiding Symptoms  Frequency: Q 1 hours   Urgency: No  Urge Incontinence: Yes  Nocturia: 1 times per night Sleep Disorder: No    Stream: mild  Hesitancy: No  Straining: No  Intermittency: No  Sensation of Incomplete Emptying: No    Stress Incontinence: Yes  Pads:  Yes 5per day     Dysuria:  No  Gross Hematuria:  No  UTI:  Yes- in past   Stones:  No    Consumes 30-40 oz of fluid per day.     No medications for his bladder in the past.      No urodynamic testing in the past.     Bowel Function:   Pt has no constipation. BM's Q 1 days.    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    Arthritis 01/23/2023    Bilateral edema of lower extremity 01/23/2023    Bilateral leg and foot pain 03/07/2023    Blistering eruption 03/07/2023    Cellulitis of left lower leg 03/07/2023    Hallux valgus with bunions 01/23/2023    Irritant contact dermatitis 01/23/2023    Obesity, unspecified 05/04/2022    Class 2 obesity with body mass index (BMI) of 37.0 to 37.9 in adult    Overweight 01/23/2023    Persistent dry cough 03/07/2023    Personal history of diseases of the blood and blood-forming organs and certain disorders involving the immune mechanism 10/10/2013    History of anemia    Personal history of other diseases of  the nervous system and sense organs 07/08/2019    History of impacted cerumen    Rash/skin eruption 03/07/2023    Tinea pedis of left foot 03/07/2023    Vision abnormalities 01/23/2023     Past Surgical History:   Procedure Laterality Date    OTHER SURGICAL HISTORY  04/16/2020    Complete colonoscopy    OTHER SURGICAL HISTORY  04/16/2020    Ankle fracture repair    PROSTATE SURGERY  01/31/2018    Prostate Surgery     Social History     Tobacco Use    Smoking status: Never    Smokeless tobacco: Never    Tobacco comments:     never smoker, little etoh , no illicit drugs    Substance Use Topics    Alcohol use: Not Currently     Family History   Problem Relation Name Age of Onset    Heart disease Mother      Hypertension Father      Heart disease Father       [unfilled]  Current Outpatient Medications on File Prior to Visit   Medication Sig Dispense Refill    atorvastatin (Lipitor) 40 mg tablet Take 1 tablet (40 mg) by mouth once daily. 90 tablet 3    cholecalciferol (Vitamin D-3) 25 MCG (1000 UT) tablet Take 1 tablet (25 mcg) by mouth once daily.      DULoxetine (Cymbalta) 60 mg DR capsule Take 1 capsule (60 mg) by mouth once daily.      etodolac (Lodine) 200 mg capsule Take 1 capsule (200 mg) by mouth once daily.      furosemide (Lasix) 40 mg tablet TAKE 1 TABLET BY MOUTH DAILY AS  DIRECTED 100 tablet 2    leuprolide, 6 month, 45 mg injection Inject under the skin.      lisinopril 20 mg tablet Take 1 tablet (20 mg) by mouth once daily. (Patient not taking: Reported on 1/10/2024) 90 tablet 3    lisinopriL-hydrochlorothiazide 20-12.5 mg tablet Take 1 tablet by mouth once daily.      MULTIVITAMIN ORAL       tadalafil (Cialis) 5 mg tablet Take 1 tablet (5 mg) by mouth once daily. 30 tablet 11    vit C-E-cupric-zinc-lutein (PreserVision Lutein) 226-90-0.8-5 mg capsule capsule TAKE AS DIRECTED.       No current facility-administered medications on file prior to visit.     Federico has No Known Allergies.     Review of  Systems    14 point ROS reviewed and discussed with the patient. Pertinent positives/negatives discussed in the History of Present Illness (HPI).      FH:  Prostate CA: Yes- self   Bladder CA: No  Kidney CA: No  Stones: No    Social History  Occupation:    Tob: No  Etoh: Yes- not very often       Objective:   Physical Exam   1. Constitutional: NAD, Well-developed, Well-nourished  2. Respiratory: Unlabored breathing, no audible wheezes, no use of accessory muscles   3. Cardiovascular: No JVD, extremities perfused, no edema  4. Abdomen: Soft, non-tender, non-distended, no masses or hernia.  No CVA tenderness.    5. Skin: no visible lesions, plaque, rashes, jaundice  6. Neuro: Gait normal, no focal neurologic deficit  7. Psychiatric: Mood and affect normal and appropriate, alert and oriented    Labs  Lab Results   Component Value Date    TESTOSTERONE 436 01/15/2022    TESTOSTERONE <60 (L) 10/14/2021    TESTOSTERONE <60 (L) 07/17/2021     Lab Results   Component Value Date    PSA <0.10 07/10/2024     Lab Results   Component Value Date    GFRMALE >90 03/24/2023     Lab Results   Component Value Date    CREATININE 0.80 03/24/2023     Lab Results   Component Value Date    CHOL 130 11/21/2022     Lab Results   Component Value Date    HDL 54.2 11/21/2022     Lab Results   Component Value Date    CHHDL 2.4 11/21/2022     Lab Results   Component Value Date    LDLF 65 11/21/2022     Lab Results   Component Value Date    VLDL 10 11/21/2022     Lab Results   Component Value Date    TRIG 52 11/21/2022     Lab Results   Component Value Date    HGBA1C 5.4 10/23/2020     Lab Results   Component Value Date    TESTF 1.8 (L) 04/12/2021     Lab Results   Component Value Date    HCT 46.1 03/24/2023     PVR: 0ml    Procedure:  After informed consent was obtained, the patient was taken to the procedure room for cystoscopy due to KATE.     Cystoscopy     Procedure Note:    A sterile prep and drape was performed in standard fashion.  Lidocaine was used for topical anesthesia. A flexible cystoscope was inserted into the urethra without difficulty revealing normal urethra.     The prostate surgically absent     Then entered the bladder revealing bladder mucosa with no erythematous patches or plaques, foreign bodies, stones or papillary lesions. The ureteral orifices were visualized bilaterally. These were orthotopic in location and effluxing clear urine. No masses were seen on retroflexion.     Significant leakage with standing    Post-Procedure:   The cystoscope was removed. The vital signs were stable . The patient tolerated the procedure well. There were no complications.      Assessment/Plan:   Federico Thorpe presents with       1. Benign prostatic hyperplasia without lower urinary tract symptoms    2. Male stress incontinence    3. Malignant neoplasm of prostate (Multi)      AUS  An artificial urinary sphincter is a device that is surgically placed to help with urinary incontinence. The device is filled with saline and uses the fluid to open and close a cuff surrounding the urethra. When you need to urinate, you squeeze and release the pump in the scrotum to remove fluid from the cuff. When the cuff is empty, urine can flow out of the bladder. The cuff automatically refills in a few minutes squeezing the urethra closed to restore bladder control, mimicking natural urinary control. This surgery is a good option for men with adequate dexterity who are not able to control their incontinence with less invasive methods.  Expectations were set that the goal would be to improve his continence, but may not be completely dry.  We also discussed that there may require revision surgery at a later day (5-10yrs) due to urethra atrophy or decreased pressure within the system.  Mechanical failures can also happen.  Risks including bleeding, pain, infection, erosion, injury to adjacent organs such as testicles, urethra, erectile bodies, bladder, bowel or  major blood vessels discussed.

## 2024-09-23 ENCOUNTER — APPOINTMENT (OUTPATIENT)
Dept: UROLOGY | Facility: HOSPITAL | Age: 69
End: 2024-09-23
Payer: MEDICARE

## 2024-10-09 ENCOUNTER — CLINICAL SUPPORT (OUTPATIENT)
Dept: PREADMISSION TESTING | Facility: HOSPITAL | Age: 69
End: 2024-10-09
Payer: MEDICARE

## 2024-10-09 DIAGNOSIS — C61 MALIGNANT NEOPLASM OF PROSTATE (MULTI): ICD-10-CM

## 2024-10-09 DIAGNOSIS — N39.3 MALE STRESS INCONTINENCE: ICD-10-CM

## 2024-10-09 RX ORDER — ACETAMINOPHEN 500 MG
1000 TABLET ORAL EVERY 6 HOURS PRN
COMMUNITY

## 2024-10-15 ENCOUNTER — DOCUMENTATION (OUTPATIENT)
Dept: PREADMISSION TESTING | Facility: HOSPITAL | Age: 69
End: 2024-10-15

## 2024-10-15 ENCOUNTER — PRE-ADMISSION TESTING (OUTPATIENT)
Dept: PREADMISSION TESTING | Facility: HOSPITAL | Age: 69
End: 2024-10-15
Payer: MEDICARE

## 2024-10-15 ENCOUNTER — LAB (OUTPATIENT)
Dept: LAB | Facility: LAB | Age: 69
End: 2024-10-15

## 2024-10-15 VITALS
HEART RATE: 74 BPM | TEMPERATURE: 97.3 F | BODY MASS INDEX: 36.33 KG/M2 | OXYGEN SATURATION: 97 % | WEIGHT: 231.48 LBS | RESPIRATION RATE: 16 BRPM | HEIGHT: 67 IN | SYSTOLIC BLOOD PRESSURE: 136 MMHG | DIASTOLIC BLOOD PRESSURE: 56 MMHG

## 2024-10-15 DIAGNOSIS — C61 MALIGNANT NEOPLASM OF PROSTATE (MULTI): ICD-10-CM

## 2024-10-15 DIAGNOSIS — R94.5 ABNORMAL RESULTS OF LIVER FUNCTION STUDIES: ICD-10-CM

## 2024-10-15 DIAGNOSIS — N39.3 MALE STRESS INCONTINENCE: ICD-10-CM

## 2024-10-15 DIAGNOSIS — Z01.818 PRE-OP TESTING: ICD-10-CM

## 2024-10-15 DIAGNOSIS — Z01.818 PRE-OP TESTING: Primary | ICD-10-CM

## 2024-10-15 DIAGNOSIS — R39.9 SYMPTOMS INVOLVING URINARY SYSTEM: ICD-10-CM

## 2024-10-15 DIAGNOSIS — R79.1 ABNORMAL COAGULATION PROFILE: ICD-10-CM

## 2024-10-15 LAB
ANION GAP SERPL CALC-SCNC: 10 MMOL/L (ref 10–20)
APPEARANCE UR: CLEAR
APTT PPP: 32 SECONDS (ref 27–38)
BILIRUB UR STRIP.AUTO-MCNC: NEGATIVE MG/DL
BUN SERPL-MCNC: 15 MG/DL (ref 6–23)
CALCIUM SERPL-MCNC: 9.2 MG/DL (ref 8.6–10.3)
CHLORIDE SERPL-SCNC: 104 MMOL/L (ref 98–107)
CO2 SERPL-SCNC: 31 MMOL/L (ref 21–32)
COLOR UR: NORMAL
CREAT SERPL-MCNC: 0.78 MG/DL (ref 0.5–1.3)
EGFRCR SERPLBLD CKD-EPI 2021: >90 ML/MIN/1.73M*2
ERYTHROCYTE [DISTWIDTH] IN BLOOD BY AUTOMATED COUNT: 13.1 % (ref 11.5–14.5)
GLUCOSE SERPL-MCNC: 62 MG/DL (ref 74–99)
GLUCOSE UR STRIP.AUTO-MCNC: NORMAL MG/DL
HCT VFR BLD AUTO: 45.2 % (ref 41–52)
HGB BLD-MCNC: 15 G/DL (ref 13.5–17.5)
INR PPP: 1 (ref 0.9–1.1)
KETONES UR STRIP.AUTO-MCNC: NEGATIVE MG/DL
LEUKOCYTE ESTERASE UR QL STRIP.AUTO: NEGATIVE
MCH RBC QN AUTO: 31.4 PG (ref 26–34)
MCHC RBC AUTO-ENTMCNC: 33.2 G/DL (ref 32–36)
MCV RBC AUTO: 95 FL (ref 80–100)
NITRITE UR QL STRIP.AUTO: NEGATIVE
NRBC BLD-RTO: 0 /100 WBCS (ref 0–0)
PH UR STRIP.AUTO: 7 [PH]
PLATELET # BLD AUTO: 208 X10*3/UL (ref 150–450)
POTASSIUM SERPL-SCNC: 4.3 MMOL/L (ref 3.5–5.3)
PROT UR STRIP.AUTO-MCNC: NEGATIVE MG/DL
PROTHROMBIN TIME: 10.8 SECONDS (ref 9.8–12.8)
RBC # BLD AUTO: 4.78 X10*6/UL (ref 4.5–5.9)
RBC # UR STRIP.AUTO: NEGATIVE /UL
SODIUM SERPL-SCNC: 141 MMOL/L (ref 136–145)
SP GR UR STRIP.AUTO: 1.02
UROBILINOGEN UR STRIP.AUTO-MCNC: NORMAL MG/DL
WBC # BLD AUTO: 6 X10*3/UL (ref 4.4–11.3)

## 2024-10-15 PROCEDURE — 87081 CULTURE SCREEN ONLY: CPT | Mod: AHULAB | Performed by: NURSE PRACTITIONER

## 2024-10-15 PROCEDURE — 80048 BASIC METABOLIC PNL TOTAL CA: CPT

## 2024-10-15 PROCEDURE — 93005 ELECTROCARDIOGRAM TRACING: CPT | Performed by: NURSE PRACTITIONER

## 2024-10-15 PROCEDURE — 36415 COLL VENOUS BLD VENIPUNCTURE: CPT

## 2024-10-15 PROCEDURE — 85610 PROTHROMBIN TIME: CPT

## 2024-10-15 PROCEDURE — 85730 THROMBOPLASTIN TIME PARTIAL: CPT

## 2024-10-15 PROCEDURE — 85027 COMPLETE CBC AUTOMATED: CPT

## 2024-10-15 PROCEDURE — 81003 URINALYSIS AUTO W/O SCOPE: CPT

## 2024-10-15 RX ORDER — CHLORHEXIDINE GLUCONATE ORAL RINSE 1.2 MG/ML
SOLUTION DENTAL
Qty: 475 ML | Refills: 0 | Status: SHIPPED | OUTPATIENT
Start: 2024-10-15

## 2024-10-15 ASSESSMENT — ENCOUNTER SYMPTOMS
DIARRHEA: 0
ARTHRALGIAS: 1
NAUSEA: 0
GASTROINTESTINAL NEGATIVE: 1
CARDIOVASCULAR NEGATIVE: 1
VOMITING: 0
CONSTITUTIONAL NEGATIVE: 1
NECK STIFFNESS: 1
DYSPNEA AT REST: 0
DYSPNEA WITH EXERTION: 0
ABDOMINAL PAIN: 0
CONSTIPATION: 0
BRUISES/BLEEDS EASILY: 0
NEUROLOGICAL NEGATIVE: 1
PALPITATIONS: 0
ENDOCRINE NEGATIVE: 1
DIFFICULTY URINATING: 1

## 2024-10-15 NOTE — CPM/PAT NURSE NOTE
CPM/PAT Nurse Note      Name: Federico Thorpe (Federico Thorpe)  /Age: 1955/69 y.o.       Past Medical History:   Diagnosis Date    Arachnoid cyst 2021    Stable per head CT    Arthritis 2023    Bilateral edema of lower extremity 2023    on HCTZ, compression stockings    Bradycardia     resolved after stopping metoprolol    Chronic pain disorder     long history of back pain    ED (erectile dysfunction)     Hallux valgus with bunions 2023    Hyperlipidemia     Hypertension     Macular degeneration     mild    Obesity (BMI 30.0-34.9)     Obesity, unspecified 2022    Class 2 obesity with body mass index (BMI) of 37.0 to 37.9 in adult    Persistent dry cough 2023    PONV (postoperative nausea and vomiting)     Prostate cancer (Multi)     radiation compleyed 2020    Sleep apnea     setting= 12.5, uses nightly    Stress incontinence, male     Vitamin D deficiency        Past Surgical History:   Procedure Laterality Date    ANKLE FRACTURE SURGERY Right 1984    COLONOSCOPY  2020    PROSTATE SURGERY      Prostate Surgery       Patient  has no history on file for sexual activity.    Family History   Problem Relation Name Age of Onset    Heart disease Mother      Hypertension Father      Heart disease Father         No Known Allergies    Prior to Admission medications    Medication Sig Start Date End Date Taking? Authorizing Provider   acetaminophen (Tylenol) 500 mg tablet Take 2 tablets (1,000 mg) by mouth every 6 hours if needed for mild pain (1 - 3).    Historical Provider, MD   atorvastatin (Lipitor) 40 mg tablet Take 1 tablet (40 mg) by mouth once daily. 3/7/23 10/15/24  Lo Al MD   cholecalciferol (Vitamin D-3) 25 MCG (1000 UT) tablet Take 1 tablet (25 mcg) by mouth once daily. 21   Historical Provider, MD   DULoxetine (Cymbalta) 60 mg DR capsule Take 1 capsule (60 mg) by mouth once daily.    Historical Provider, MD   furosemide (Lasix) 40 mg  tablet TAKE 1 TABLET BY MOUTH DAILY AS  DIRECTED  Patient not taking: Reported on 10/9/2024 6/26/23   Lo Al MD   lisinopriL-hydrochlorothiazide 20-12.5 mg tablet Take 1 tablet by mouth once daily. 11/11/23   Historical Provider, MD   MULTIVITAMIN ORAL     Historical Provider, MD   tadalafil (Cialis) 5 mg tablet Take 1 tablet (5 mg) by mouth once daily. 7/24/24 7/24/25  Holly Sepulveda MD   TURMERIC ORAL Take 1 capsule by mouth 3 times a day.    Historical Provider, MD   vit C-E-cupric-zinc-lutein (PreserVision Lutein) 226-90-0.8-5 mg capsule capsule TAKE AS DIRECTED. 7/6/22   Historical Provider, MD   etodolac (Lodine) 200 mg capsule Take 1 capsule (200 mg) by mouth once daily. 9/20/19 10/9/24  Historical Provider, MD   leuprolide, 6 month, 45 mg injection Inject under the skin. 1/28/21 10/9/24  Historical Provider, MD   lisinopril 20 mg tablet Take 1 tablet (20 mg) by mouth once daily.  Patient not taking: Reported on 1/10/2024 6/9/23 10/9/24  Lo Al MD        PAT ROS     DASI Risk Score    No data to display       Caprini DVT Assessment    No data to display       Modified Frailty Index    No data to display       CHADS2 Stroke Risk  Current as of 9 minutes ago        N/A 3 to 100%: High Risk   2 to < 3%: Medium Risk   0 to < 2%: Low Risk     Last Change: N/A          This score determines the patient's risk of having a stroke if the patient has atrial fibrillation.        This score is not applicable to this patient. Components are not calculated.          Revised Cardiac Risk Index    No data to display       Apfel Simplified Score    No data to display       Risk Analysis Index Results This Encounter    No data found in the last 10 encounters.       After Visit Summary (AVS) reviewed and patient verbalized good understanding of medications and NPO instructions.  Pre-op infection prevention measures:  CHG showers and mouthwash reviewed, understanding voiced.  CHG soap given and  patient verbalized need to pick CHG mouthwash at their preferred local pharmacy.     Nurse Plan of Action:

## 2024-10-15 NOTE — CPM/PAT NURSE NOTE
CPM/PAT Nurse Note      Name: Federico Thorpe (Federico Thorpe)  /Age: 1955/69 y.o.       Past Medical History:   Diagnosis Date    Arachnoid cyst 2021    Stable per head CT    Arthritis 2023    Bilateral edema of lower extremity 2023    on HCTZ, compression stockings    Bradycardia     resolved after stopping metoprolol    Chronic pain disorder     long history of back pain    ED (erectile dysfunction)     Hallux valgus with bunions 2023    Hyperlipidemia     Hypertension     Macular degeneration     mild    Obesity (BMI 30.0-34.9)     Obesity, unspecified 2022    Class 2 obesity with body mass index (BMI) of 37.0 to 37.9 in adult    Persistent dry cough 2023    PONV (postoperative nausea and vomiting)     Prostate cancer (Multi)     radiation compleyed 2020    Sleep apnea     setting= 12.5, uses nightly    Stress incontinence, male     Vitamin D deficiency        Past Surgical History:   Procedure Laterality Date    ANKLE FRACTURE SURGERY Right 1984    COLONOSCOPY  2020    PROSTATE SURGERY      Prostate Surgery       Patient  has no history on file for sexual activity.    Family History   Problem Relation Name Age of Onset    Heart disease Mother      Hypertension Father      Heart disease Father         No Known Allergies    Prior to Admission medications    Medication Sig Start Date End Date Taking? Authorizing Provider   acetaminophen (Tylenol) 500 mg tablet Take 2 tablets (1,000 mg) by mouth every 6 hours if needed for mild pain (1 - 3).    Historical Provider, MD   atorvastatin (Lipitor) 40 mg tablet Take 1 tablet (40 mg) by mouth once daily. 3/7/23 10/15/24  Lo Al MD   cholecalciferol (Vitamin D-3) 25 MCG (1000 UT) tablet Take 1 tablet (25 mcg) by mouth once daily. 21   Historical Provider, MD   DULoxetine (Cymbalta) 60 mg DR capsule Take 1 capsule (60 mg) by mouth once daily.    Historical Provider, MD   furosemide (Lasix) 40 mg  tablet TAKE 1 TABLET BY MOUTH DAILY AS  DIRECTED  Patient not taking: Reported on 10/9/2024 6/26/23   Lo Al MD   lisinopriL-hydrochlorothiazide 20-12.5 mg tablet Take 1 tablet by mouth once daily. 11/11/23   Historical Provider, MD   MULTIVITAMIN ORAL     Historical Provider, MD   tadalafil (Cialis) 5 mg tablet Take 1 tablet (5 mg) by mouth once daily. 7/24/24 7/24/25  Holly Sepulveda MD   TURMERIC ORAL Take 1 capsule by mouth 3 times a day.    Historical Provider, MD   vit C-E-cupric-zinc-lutein (PreserVision Lutein) 226-90-0.8-5 mg capsule capsule TAKE AS DIRECTED. 7/6/22   Historical Provider, MD   etodolac (Lodine) 200 mg capsule Take 1 capsule (200 mg) by mouth once daily. 9/20/19 10/9/24  Historical Provider, MD   leuprolide, 6 month, 45 mg injection Inject under the skin. 1/28/21 10/9/24  Historical Provider, MD   lisinopril 20 mg tablet Take 1 tablet (20 mg) by mouth once daily.  Patient not taking: Reported on 1/10/2024 6/9/23 10/9/24  Lo Al MD        PAT ROS     DASI Risk Score    No data to display       Caprini DVT Assessment    No data to display       Modified Frailty Index    No data to display       CHADS2 Stroke Risk  Current as of 10 minutes ago        N/A 3 to 100%: High Risk   2 to < 3%: Medium Risk   0 to < 2%: Low Risk     Last Change: N/A          This score determines the patient's risk of having a stroke if the patient has atrial fibrillation.        This score is not applicable to this patient. Components are not calculated.          Revised Cardiac Risk Index    No data to display       Apfel Simplified Score    No data to display       Risk Analysis Index Results This Encounter    No data found in the last 10 encounters.       After Visit Summary (AVS) reviewed and patient verbalized good understanding of medications and NPO instructions.     Nurse Plan of Action:

## 2024-10-15 NOTE — PREPROCEDURE INSTRUCTIONS
Medication List            Accurate as of October 15, 2024  4:08 PM. Always use your most recent med list.                acetaminophen 500 mg tablet  Commonly known as: Tylenol  Notes to patient: May take on the day of surgery if needed     atorvastatin 40 mg tablet  Commonly known as: Lipitor  Take 1 tablet (40 mg) by mouth once daily.  Medication Adjustments for Surgery: Take on the morning of surgery     cholecalciferol 25 MCG (1000 UT) tablet  Commonly known as: Vitamin D-3  Medication Adjustments for Surgery: Do Not take on the morning of surgery     DULoxetine 60 mg DR capsule  Commonly known as: Cymbalta  Medication Adjustments for Surgery: Take on the morning of surgery     furosemide 40 mg tablet  Commonly known as: Lasix  TAKE 1 TABLET BY MOUTH DAILY AS  DIRECTED  Notes to patient: Patient states not taking     lisinopriL-hydrochlorothiazide 20-12.5 mg tablet  Notes to patient: Hold any evening dose the night before the day of surgery. Hold the day of surgery     MULTIVITAMIN ORAL  Additional Medication Adjustments for Surgery: Take last dose 7 days before surgery     PreserVision Lutein 226-90-0.8-5 mg capsule capsule  Generic drug: vit C-E-cupric-zinc-lutein  Additional Medication Adjustments for Surgery: Take last dose 7 days before surgery     tadalafil 5 mg tablet  Commonly known as: Cialis  Take 1 tablet (5 mg) by mouth once daily.  Medication Adjustments for Surgery: Take last dose 3 days before surgery     TURMERIC ORAL  Additional Medication Adjustments for Surgery: Take last dose 7 days before surgery                          **Concerning above medication instructions, if medication is normally taken at night, continue normal schedule.**  **DO NOT TAKE NIGHT PRIOR AND MORNING OF SURGERY**    CONTACT SURGEON'S OFFICE IF YOU DEVELOP:  * Fever = 100.4 F   * New respiratory symptoms (e.g. cough, shortness of breath, respiratory distress, sore throat)  * Recent loss of taste or smell  *Flu like  symptoms such as headache, fatigue or gastrointestinal symptoms  * You develop any open sores, shingles, burning or painful urination   AND/OR:  * You no longer wish to have the surgery.  * Any other personal circumstances change that may lead to the need to cancel or defer this surgery.  *You were admitted to any hospital within one week of your planned procedure.    SMOKING:  *Quitting smoking can make a huge difference to your health and recovery from surgery.    *If you need help with quitting, call 2-450-QUIT-NOW.    THE DAY BEFORE SURGERY:  *Do not eat any food after midnight the night before surgery.   *You are permitted to drink clear liquids (i.e. water, black coffee (no milk or cream), tea, apple juice and electrolyte drinks (gatorade)) up to 13.5 ounces, up to 2 hours before your arrival time.  *You may chew gum until 2 hours before your surgery    SURGICAL TIME  *You will be contacted between 2 p.m. and 6 p.m. the business day before your surgery with your arrival time.  *If you haven't received a call by 6pm, call [phone].  *Scheduled surgery times may change and you will be notified if this occurs-check your personal voicemail for any updates.    ON THE MORNING OF SURGERY:  *Wear comfortable, loose fitting clothing.   *Do not use moisturizers, creams, lotions or perfume.  *All jewelry and valuables should be left at home.  *Prosthetic devices such as contact lenses, hearing aids, dentures, eyelash extensions, hairpins and body piercing must be removed before surgery.    BRING WITH YOU:  *Photo ID and insurance card  *Current list of medicines and allergies  *Pacemaker/Defibrillator/Heart stent cards  *CPAP machine and mask  *Slings/splints/crutches  *Copy of your complete Advanced Directive/DHPOA-if applicable  *Neurostimulator implant remote    PARKING AND ARRIVAL:  *Check in at the Main Entrance desk and let them know you are here for surgery.  *You will be directed to the 2nd floor surgical  waiting area.    AFTER OUTPATIENT SURGERY:  *A responsible adult MUST accompany you at the time of discharge and stay with you for 24 hours after your surgery.  *You may NOT drive yourself home after surgery.  *You may use a taxi or ride sharing service ([email or web address], Uber) to return home ONLY if you are accompanied by a friend or family member.  *Instructions for resuming your medications will be provided by your surgeon.         Patient Information: Oral/Dental Rinse  **This is a prescription; pick it up at your preferred local pharmacy **  What is oral/dental rinse?   It is a mouthwash. It is a way of cleaning the mouth with a germ killing solution before your surgery.  The solution contains chlorhexidine, commonly known as CHG.   It is used inside the mouth to kill a bacteria known as Staphylococcus aureus.  Let your doctor know if you are allergic to Chlorhexidine.    Why do I need to use CHG oral/dental rinse?  The CHG oral/dental rinse helps to kill a bacteria in your mouth known a Staphylococcus aureus.     This reduces the risk of infection at the surgical site.      Using your CHG oral/dental rinse  STEPS:  Use your CHG oral/dental rinse after you brush your teeth the night before (at bedtime) and the morning of your surgery.  Follow all directions on your prescription label.    Use the cap on the container to measure 15ml (fill cap to fill line)  Swish (gargle if you can) the mouthwash in your mouth for at least 30 seconds, (do not to swallow) spit out  After you use your CHG rinse, do not rinse your mouth with water, drink or eat.  Please refer to prescription label for the appropriate time to resume oral intake  Dental rinse comes in one size bottle: 473ml ~16oz.  You will have leftover    rinse, discard after this use.    What side effects might I have using the CHG oral/dental rinse?  CHG rinse will stick to plaque on the teeth.  Brush and floss just before use.  Teeth brushing will help avoid staining of  plaque during use.    Who should I contact if I have questions about the CHG oral/dental rinse?  Please call [facility], Preadmission Testing at [phone] if you have any questions    Home Preoperative Antibacterial Shower     What is a home preoperative antibacterial shower?  This shower is a way of cleaning the skin with a germ killing soap before surgery.  The soap contains chlorhexidine, commonly known as CHG.  CHG is a soap for your skin with germ killing ability.  Let your doctor know if you are allergic to chlorhexidine.    Why do I need to take a preoperative antibacterial shower?  Skin is not sterile.  It is best to try to make your skin as free of germs as possible before surgery.  Proper cleansing with a germ killing soap before surgery can lower the number of germs on your skin.  This helps to reduce the risk of infection at the surgical site.  Following the instructions listed below will help you prepare your skin for surgery.      How do I use the CHG skin cleanser?  Steps:  Begin using your CHG soap five days before your scheduled surgery on ________________________.    Days 1-4 Shower before bed:  Wash your face and genitals with your normal soap and rinse.    Wash and rinse your hair using the CHG soap. Rinse completely, do not condition your   Hair.          3.    Apply the CHG soap to a clean wet washcloth.  Turn the water off or move away                From the water spray to avoid premature rinsing of the CHG soap as you are applying.     4.   Lather your entire body from the neck down.  Do not use on your face or genitals.   Pay special attention to the area(s) where your incision(s) will be located unless they are on your face.  Avoid scrubbing your skin too hard.  The important point is to have the CHG soap sit on your skin for 3 minutes.    When the 3 minutes are up, turn on the water and rinse the CHG soap off your body completely.   Pat yourself dry with  a clean, freshly-laundered towel.  Dress in clean, freshly laundered night clothes.    Be sure to sleep with clean, freshly laundered sheets.  Day 5:  Last shower is the morning before surgery: Follow above Instructions.    NOTE:    *Hair extensions should be removed    *Keep CHG soap out of eyes and ear canals   *DO NOT wash with regular soap on your body after you have used the CHG        soap solution  *DO NOT apply powders, lotions, or perfume.  *Deodorant may be used days 1-4, BUT NOT the day of surgery    Who should I contact if I have any questions regarding the use of CHG soap?  Call the [facility], Preadmission Testing at [phone] if you have any questions.              Patient Information: Pre-Operative Infection Prevention Measures     Why did I have my nose, under my arms and groin swabbed?  The purpose of the swab is to identify Staphylococcus aureus inside your nose or on your skin.  The swab was sent to the laboratory for culture.  A positive swab/culture for Staphylococcus aureus is called colonization or carriage.      What is Staphylococcus aureus?  Staphylococcus aureus, also known as “staph”, is a germ found on the skin or in the nose of healthy people.  Sometimes Staphylococcus aureus can get into the body and cause an infection.  This can be minor (such as pimples, boils or other skin problems).  It might also be serious (such as blood infection, pneumonia or a surgical site infection).    What is Staphylococcus aureus colonization or carriage?  Colonization or carriage means that a person has the germ but is not sick from it.  These bacteria can be spread on the hands or when breathing or sneezing.    How is Staphylococcus aureus spread?  It is most often spread by close contact with a person or item that carries it.    What happens if my culture is positive for Staphylococcus aureus?  Your doctor/medical team will use this information to guide any antibiotic  treatment which may be necessary.  Regardless of the culture results, we will clean the inside of your nose with a betadine swab just before you have your surgery.      Will I get an infection if I have Staphylococcus aureus in my nose or on my skin?  Anyone can get an infection with Staphylococcus aureus.  However, the best way to reduce your risk of infection is to follow the instructions provided to you for the use of your CHG soap and dental rinse.        Who should I contact if I have any questions?  Call the [facility], Preadmission Testing at [phone] if you have any questions.

## 2024-10-15 NOTE — H&P (VIEW-ONLY)
[facility] Evaluation       Name: Federico Thorpe (Federico Thorpe)  /Age: 1955/69 y.o.       Date of Consult: 10/15/24    Referring Provider: Dr. Samayoa    Surgery, Date, and Length:     Insertion Urinary Sphincter Device  Cystoscopy, 10/23/24, 120 min       Federico Thorpe is a 69 year-old male who presents to the [facility] for perioperative risk assessment prior to surgery.    Patient presents with a primary diagnosis of Male stress incontinence and  Malignant neoplasm of prostate.    67 yo male with prostate cancer, was diagnosed in  by Dr. Brasher, for which he underwent a prostatectomy, GG1, [place] score 3+3=6, on path. He completed XRT, and received one ADT injection for a BCR years later. PSA undetectable. F/U stress urinary incontinence, tried oxybutynin for 2 weeks, but stopped due to dizziness.      Cystoscopy      Procedure Note:    A sterile prep and drape was performed in standard fashion. Lidocaine was used for topical anesthesia. A flexible cystoscope was inserted into the urethra without difficulty revealing normal urethra.      The prostate surgically absent      Then entered the bladder revealing bladder mucosa with no erythematous patches or plaques, foreign bodies, stones or papillary lesions. The ureteral orifices were visualized bilaterally. These were orthotopic in location and effluxing clear urine. No masses were seen on retroflexion.      Significant leakage with standing     Post-Procedure:   The cystoscope was removed. The vital signs were stable . The patient tolerated the procedure well. There were no complications.    This note was created in part upon personal review of patient's medical records.      Patient is scheduled to have a Insertion Urinary Sphincter Device  Cystoscopy.      +PONV  Pt denies any past history of anesthetic complications such as awareness, prolonged sedation, dental damage, aspiration, cardiac arrest, difficult intubation, difficult I.V. access or  unexpected hospital admissions.  NO malignant hyperthermia and or pseudocholinesterase deficiency.  No history of blood transfusions     The patient is not a Bahai and will accept blood and blood products if medically indicated.       Past Medical History:   Diagnosis Date    Arachnoid cyst 07/06/2021    Stable per head CT    Arthritis 01/23/2023    Bilateral edema of lower extremity 01/23/2023    on HCTZ, compression stockings    Bradycardia     resolved after stopping metoprolol    Chronic pain disorder     long history of back pain    ED (erectile dysfunction)     Hallux valgus with bunions 01/23/2023    Hyperlipidemia     Hypertension     Macular degeneration     mild    Obesity (BMI 30.0-34.9)     Obesity, unspecified 05/04/2022    Class 2 obesity with body mass index (BMI) of 37.0 to 37.9 in adult    Persistent dry cough 03/07/2023    PONV (postoperative nausea and vomiting)     Prostate cancer (Multi)     radiation compleyed 2/2020    Sleep apnea     setting= 12.5, uses nightly    Stress incontinence, male     Vitamin D deficiency        Past Surgical History:   Procedure Laterality Date    ANKLE FRACTURE SURGERY Right 1984    COLONOSCOPY  04/16/2020    PROSTATE SURGERY  2010    Prostate Surgery         Family History   Problem Relation Name Age of Onset    Heart disease Mother      Hypertension Father      Heart disease Father         Social History     Socioeconomic History    Marital status:      Spouse name: CHRISTIE   Tobacco Use    Smoking status: Never    Smokeless tobacco: Never    Tobacco comments:     never smoker, little etoh , no illicit drugs    Vaping Use    Vaping status: Never Used   Substance and Sexual Activity    Alcohol use: Not Currently    Drug use: Never      No Known Allergies      Current Outpatient Medications:     acetaminophen (Tylenol) 500 mg tablet, Take 2 tablets (1,000 mg) by mouth every 6 hours if needed for mild pain (1 - 3)., Disp: , Rfl:     atorvastatin  (Lipitor) 40 mg tablet, Take 1 tablet (40 mg) by mouth once daily., Disp: 90 tablet, Rfl: 3    cholecalciferol (Vitamin D-3) 25 MCG (1000 UT) tablet, Take 1 tablet (25 mcg) by mouth once daily., Disp: , Rfl:     DULoxetine (Cymbalta) 60 mg DR capsule, Take 1 capsule (60 mg) by mouth once daily., Disp: , Rfl:     lisinopriL-hydrochlorothiazide 20-12.5 mg tablet, Take 1 tablet by mouth once daily., Disp: , Rfl:     MULTIVITAMIN ORAL, , Disp: , Rfl:     tadalafil (Cialis) 5 mg tablet, Take 1 tablet (5 mg) by mouth once daily., Disp: 30 tablet, Rfl: 11    TURMERIC ORAL, Take 1 capsule by mouth 3 times a day., Disp: , Rfl:     vit C-E-cupric-zinc-lutein (PreserVision Lutein) 226-90-0.8-5 mg capsule capsule, TAKE AS DIRECTED., Disp: , Rfl:     chlorhexidine (Peridex) 0.12 % solution, Swish for 30 seconds and spit 15mL of solution the night before and morning of surgery, Disp: 475 mL, Rfl: 0    furosemide (Lasix) 40 mg tablet, TAKE 1 TABLET BY MOUTH DAILY AS  DIRECTED (Patient not taking: Reported on 10/9/2024), Disp: 100 tablet, Rfl: 2      PAT ROS:   Constitutional:   neg    Neuro/Psych:   neg    Eyes:    use of corrective lenses  Ears:    no hearing aides  Nose:   Mouth:   neg    Throat:   neg    Neck:    neck stiffness  Cardio:    Functional 4 Mets. Patient denies SOB walking up 2 flights of stairs/MEF-3 sets of 20 squats    neg     no chest pain   no palpitations   no dyspnea   no PARMAR  Respiratory:    Fernando/cpap  Endocrine:   neg    GI:   neg     no abdominal pain   no constipation   no diarrhea   no nausea   no vomiting  :    Frequency/incontinence   difficulty urinating  Musculoskeletal:    arthralgias (generalized)  Hematologic:    does not bruise/bleed easily   no history of blood transfusion   no blood clots  Skin:  neg        Physical Exam  Physical exam within normal limits.   Genitourinary:     Comments: Did not examine         PAT AIRWAY:   Airway:     Mallampati::  III    Neck ROM::  Limited   No broken  "teeth, no dentures and no missing teeth          Visit Vitals  /56   Pulse 74   Temp 36.3 °C (97.3 °F)   Resp 16   Ht 1.69 m (5' 6.54\")   Wt 105 kg (231 lb 7.7 oz)   SpO2 97%   BMI 36.76 kg/m²   Smoking Status Never   BSA 2.22 m²       Plan    Neuro:  Arachnoid Cyst-Stable  07/06/21    CT HEAD WO CONTRAST; 7/6/2021 1:53 pm   IMPRESSION:  No acute intracranial bleed or focal mass effect.     Mild volume loss.     Stable probable arachnoid cyst in the anterior inferior left temporal  fossa..     Rightward projecting mid nasal septal bone spur with rightward nasal  septal deviation.     Stable findings of chronic left sphenoid sinusitis.    Cardiovascular:  Patient denies any chest pain, tightness, heaviness, pressure, radiating pain, palpitations, irregular heartbeats, lightheadedness, cough, congestion, shortness of breath, PARMAR, PND, near syncope, weight loss or gain.    HLD:  Atorvastatin    HTN:  Lisinopril-hydrochlorothiazide  Well controlled    RCRI: 0  Risk of Mace: 3.9%    EKG in PAT on 10/15/24       DUPLEX LOWER EXTREMITY VEINS, BILATERAL;  6/23/2022 4:06 pm     Bilateral lower extremity swelling.   IMPRESSION:  No acute femoropopliteal DVT in the bilateral lower extremities. If  there remains clinical concern for acute lower extremity DVT then a  follow-up evaluation can be obtained in 5-7 days for further  assessment.    Pulm:  Known and treated  FLAKITA- Patient was instructed to bring CPAP machine the morning of surgery. Recommend prioritizing  nonopioid analgesic techniques (regional and local anesthesia, nonsteroidal medications, etc) before the administration of opioids and close monitoring for hypoventilation after surgery due to FLAKITA. Increased vigilance is recommended with the use of narcotics due to an increased risk for opioid induced respiratory depression    Wears CPAP    GI/:  History of Prostate CA  Cialis    Heme:  Patient instructed to ambulate as soon as possible postoperatively to " decrease thromboembolic risk.    Initiate mechanical DVT prophylaxis as soon as possible and initiate chemical prophylaxis when deemed safe from a bleeding standpoint post surgery.    Caprini=7    Risk assessment complete.  Patient is scheduled for an intermediate surgical risk procedure.        Labs/testing obtained in PAT on 10/15/24  CBC, COAG, BMP, UA, MRSA, EKG    Lab Results   Component Value Date    WBC 6.0 10/15/2024    HGB 15.0 10/15/2024    HCT 45.2 10/15/2024    MCV 95 10/15/2024     10/15/2024     Lab Results   Component Value Date    GLUCOSE 62 (L) 10/15/2024    CALCIUM 9.2 10/15/2024     10/15/2024    K 4.3 10/15/2024    CO2 31 10/15/2024     10/15/2024    BUN 15 10/15/2024    CREATININE 0.78 10/15/2024     Lab Results   Component Value Date    INR 1.0 10/15/2024    INR 1.0 07/11/2020    PROTIME 10.8 10/15/2024    PROTIME 11.1 07/11/2020     Labs reviewed, unremarkable.    Follow up: UA and MRSA      Preoperative medication instructions were provided and reviewed with the patient.  Any additional testing or evaluation was explained to the patient.  Nothing by mouth instructions were discussed and patient's questions were answered prior to conclusion to this encounter.  Patient verbalized understanding of preoperative instructions given in preadmission testing; discharge instructions available in EMR.    This note was dictated with speech recognition.  Minor errors may have been detected during use of speech recognition.

## 2024-10-15 NOTE — CPM/PAT H&P
[facility] Evaluation       Name: Federico Thorpe (Federico Thorpe)  /Age: 1955/69 y.o.       Date of Consult: 10/15/24    Referring Provider: Dr. Samayoa    Surgery, Date, and Length:     Insertion Urinary Sphincter Device  Cystoscopy, 10/23/24, 120 min       Federico Thorpe is a 69 year-old male who presents to the [facility] for perioperative risk assessment prior to surgery.    Patient presents with a primary diagnosis of Male stress incontinence and  Malignant neoplasm of prostate.    67 yo male with prostate cancer, was diagnosed in  by Dr. Brasher, for which he underwent a prostatectomy, GG1, Anthony score 3+3=6, on path. He completed XRT, and received one ADT injection for a BCR years later. PSA undetectable. F/U stress urinary incontinence, tried oxybutynin for 2 weeks, but stopped due to dizziness.      Cystoscopy      Procedure Note:    A sterile prep and drape was performed in standard fashion. Lidocaine was used for topical anesthesia. A flexible cystoscope was inserted into the urethra without difficulty revealing normal urethra.      The prostate surgically absent      Then entered the bladder revealing bladder mucosa with no erythematous patches or plaques, foreign bodies, stones or papillary lesions. The ureteral orifices were visualized bilaterally. These were orthotopic in location and effluxing clear urine. No masses were seen on retroflexion.      Significant leakage with standing     Post-Procedure:   The cystoscope was removed. The vital signs were stable . The patient tolerated the procedure well. There were no complications.    This note was created in part upon personal review of patient's medical records.      Patient is scheduled to have a Insertion Urinary Sphincter Device  Cystoscopy.      +PONV  Pt denies any past history of anesthetic complications such as awareness, prolonged sedation, dental damage, aspiration, cardiac arrest, difficult intubation, difficult I.V. access or  unexpected hospital admissions.  NO malignant hyperthermia and or pseudocholinesterase deficiency.  No history of blood transfusions     The patient is not a Denominational and will accept blood and blood products if medically indicated.       Past Medical History:   Diagnosis Date    Arachnoid cyst 07/06/2021    Stable per head CT    Arthritis 01/23/2023    Bilateral edema of lower extremity 01/23/2023    on HCTZ, compression stockings    Bradycardia     resolved after stopping metoprolol    Chronic pain disorder     long history of back pain    ED (erectile dysfunction)     Hallux valgus with bunions 01/23/2023    Hyperlipidemia     Hypertension     Macular degeneration     mild    Obesity (BMI 30.0-34.9)     Obesity, unspecified 05/04/2022    Class 2 obesity with body mass index (BMI) of 37.0 to 37.9 in adult    Persistent dry cough 03/07/2023    PONV (postoperative nausea and vomiting)     Prostate cancer (Multi)     radiation compleyed 2/2020    Sleep apnea     setting= 12.5, uses nightly    Stress incontinence, male     Vitamin D deficiency        Past Surgical History:   Procedure Laterality Date    ANKLE FRACTURE SURGERY Right 1984    COLONOSCOPY  04/16/2020    PROSTATE SURGERY  2010    Prostate Surgery         Family History   Problem Relation Name Age of Onset    Heart disease Mother      Hypertension Father      Heart disease Father         Social History     Socioeconomic History    Marital status:      Spouse name: CHRISTIE   Tobacco Use    Smoking status: Never    Smokeless tobacco: Never    Tobacco comments:     never smoker, little etoh , no illicit drugs    Vaping Use    Vaping status: Never Used   Substance and Sexual Activity    Alcohol use: Not Currently    Drug use: Never      No Known Allergies      Current Outpatient Medications:     acetaminophen (Tylenol) 500 mg tablet, Take 2 tablets (1,000 mg) by mouth every 6 hours if needed for mild pain (1 - 3)., Disp: , Rfl:     atorvastatin  (Lipitor) 40 mg tablet, Take 1 tablet (40 mg) by mouth once daily., Disp: 90 tablet, Rfl: 3    cholecalciferol (Vitamin D-3) 25 MCG (1000 UT) tablet, Take 1 tablet (25 mcg) by mouth once daily., Disp: , Rfl:     DULoxetine (Cymbalta) 60 mg DR capsule, Take 1 capsule (60 mg) by mouth once daily., Disp: , Rfl:     lisinopriL-hydrochlorothiazide 20-12.5 mg tablet, Take 1 tablet by mouth once daily., Disp: , Rfl:     MULTIVITAMIN ORAL, , Disp: , Rfl:     tadalafil (Cialis) 5 mg tablet, Take 1 tablet (5 mg) by mouth once daily., Disp: 30 tablet, Rfl: 11    TURMERIC ORAL, Take 1 capsule by mouth 3 times a day., Disp: , Rfl:     vit C-E-cupric-zinc-lutein (PreserVision Lutein) 226-90-0.8-5 mg capsule capsule, TAKE AS DIRECTED., Disp: , Rfl:     chlorhexidine (Peridex) 0.12 % solution, Swish for 30 seconds and spit 15mL of solution the night before and morning of surgery, Disp: 475 mL, Rfl: 0    furosemide (Lasix) 40 mg tablet, TAKE 1 TABLET BY MOUTH DAILY AS  DIRECTED (Patient not taking: Reported on 10/9/2024), Disp: 100 tablet, Rfl: 2      PAT ROS:   Constitutional:   neg    Neuro/Psych:   neg    Eyes:    use of corrective lenses  Ears:    no hearing aides  Nose:   Mouth:   neg    Throat:   neg    Neck:    neck stiffness  Cardio:    Functional 4 Mets. Patient denies SOB walking up 2 flights of stairs/MEF-3 sets of 20 squats    neg     no chest pain   no palpitations   no dyspnea   no PARMAR  Respiratory:    Fernando/cpap  Endocrine:   neg    GI:   neg     no abdominal pain   no constipation   no diarrhea   no nausea   no vomiting  :    Frequency/incontinence   difficulty urinating  Musculoskeletal:    arthralgias (generalized)  Hematologic:    does not bruise/bleed easily   no history of blood transfusion   no blood clots  Skin:  neg        Physical Exam  Physical exam within normal limits.   Genitourinary:     Comments: Did not examine         PAT AIRWAY:   Airway:     Mallampati::  III    Neck ROM::  Limited   No broken  "teeth, no dentures and no missing teeth          Visit Vitals  /56   Pulse 74   Temp 36.3 °C (97.3 °F)   Resp 16   Ht 1.69 m (5' 6.54\")   Wt 105 kg (231 lb 7.7 oz)   SpO2 97%   BMI 36.76 kg/m²   Smoking Status Never   BSA 2.22 m²       Plan    Neuro:  Arachnoid Cyst-Stable  07/06/21    CT HEAD WO CONTRAST; 7/6/2021 1:53 pm   IMPRESSION:  No acute intracranial bleed or focal mass effect.     Mild volume loss.     Stable probable arachnoid cyst in the anterior inferior left temporal  fossa..     Rightward projecting mid nasal septal bone spur with rightward nasal  septal deviation.     Stable findings of chronic left sphenoid sinusitis.    Cardiovascular:  Patient denies any chest pain, tightness, heaviness, pressure, radiating pain, palpitations, irregular heartbeats, lightheadedness, cough, congestion, shortness of breath, PARMAR, PND, near syncope, weight loss or gain.    HLD:  Atorvastatin    HTN:  Lisinopril-hydrochlorothiazide  Well controlled    RCRI: 0  Risk of Mace: 3.9%    EKG in PAT on 10/15/24       DUPLEX LOWER EXTREMITY VEINS, BILATERAL;  6/23/2022 4:06 pm     Bilateral lower extremity swelling.   IMPRESSION:  No acute femoropopliteal DVT in the bilateral lower extremities. If  there remains clinical concern for acute lower extremity DVT then a  follow-up evaluation can be obtained in 5-7 days for further  assessment.    Pulm:  Known and treated  FLAKITA- Patient was instructed to bring CPAP machine the morning of surgery. Recommend prioritizing  nonopioid analgesic techniques (regional and local anesthesia, nonsteroidal medications, etc) before the administration of opioids and close monitoring for hypoventilation after surgery due to FLAKITA. Increased vigilance is recommended with the use of narcotics due to an increased risk for opioid induced respiratory depression    Wears CPAP    GI/:  History of Prostate CA  Cialis    Heme:  Patient instructed to ambulate as soon as possible postoperatively to " decrease thromboembolic risk.    Initiate mechanical DVT prophylaxis as soon as possible and initiate chemical prophylaxis when deemed safe from a bleeding standpoint post surgery.    Caprini=7    Risk assessment complete.  Patient is scheduled for an intermediate surgical risk procedure.        Labs/testing obtained in PAT on 10/15/24  CBC, COAG, BMP, UA, MRSA, EKG    Lab Results   Component Value Date    WBC 6.0 10/15/2024    HGB 15.0 10/15/2024    HCT 45.2 10/15/2024    MCV 95 10/15/2024     10/15/2024     Lab Results   Component Value Date    GLUCOSE 62 (L) 10/15/2024    CALCIUM 9.2 10/15/2024     10/15/2024    K 4.3 10/15/2024    CO2 31 10/15/2024     10/15/2024    BUN 15 10/15/2024    CREATININE 0.78 10/15/2024     Lab Results   Component Value Date    INR 1.0 10/15/2024    INR 1.0 07/11/2020    PROTIME 10.8 10/15/2024    PROTIME 11.1 07/11/2020     Labs reviewed, unremarkable.    Follow up: UA and MRSA      Preoperative medication instructions were provided and reviewed with the patient.  Any additional testing or evaluation was explained to the patient.  Nothing by mouth instructions were discussed and patient's questions were answered prior to conclusion to this encounter.  Patient verbalized understanding of preoperative instructions given in preadmission testing; discharge instructions available in EMR.    This note was dictated with speech recognition.  Minor errors may have been detected during use of speech recognition.

## 2024-10-16 LAB — HOLD SPECIMEN: NORMAL

## 2024-10-17 LAB — STAPHYLOCOCCUS SPEC CULT: NORMAL

## 2024-10-22 ENCOUNTER — ANESTHESIA EVENT (OUTPATIENT)
Dept: OPERATING ROOM | Facility: HOSPITAL | Age: 69
End: 2024-10-22
Payer: COMMERCIAL
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
## 2024-10-23 ENCOUNTER — HOSPITAL ENCOUNTER (OUTPATIENT)
Facility: HOSPITAL | Age: 69
Setting detail: OUTPATIENT SURGERY
Discharge: HOME | End: 2024-10-23
Attending: UROLOGY | Admitting: UROLOGY
Payer: COMMERCIAL

## 2024-10-23 ENCOUNTER — ANESTHESIA (OUTPATIENT)
Dept: OPERATING ROOM | Facility: HOSPITAL | Age: 69
End: 2024-10-23
Payer: COMMERCIAL

## 2024-10-23 VITALS
HEART RATE: 67 BPM | SYSTOLIC BLOOD PRESSURE: 149 MMHG | DIASTOLIC BLOOD PRESSURE: 66 MMHG | TEMPERATURE: 97.5 F | WEIGHT: 233.25 LBS | RESPIRATION RATE: 18 BRPM | HEIGHT: 67 IN | OXYGEN SATURATION: 99 % | BODY MASS INDEX: 36.61 KG/M2

## 2024-10-23 DIAGNOSIS — C61 MALIGNANT NEOPLASM OF PROSTATE (MULTI): ICD-10-CM

## 2024-10-23 DIAGNOSIS — N39.3 MALE STRESS INCONTINENCE: Primary | ICD-10-CM

## 2024-10-23 LAB
ABO GROUP (TYPE) IN BLOOD: NORMAL
ANTIBODY SCREEN: NORMAL
RH FACTOR (ANTIGEN D): NORMAL

## 2024-10-23 PROCEDURE — 53445 INSERT URO/VES NCK SPHINCTER: CPT | Performed by: UROLOGY

## 2024-10-23 PROCEDURE — 7100000001 HC RECOVERY ROOM TIME - INITIAL BASE CHARGE: Performed by: UROLOGY

## 2024-10-23 PROCEDURE — 2500000001 HC RX 250 WO HCPCS SELF ADMINISTERED DRUGS (ALT 637 FOR MEDICARE OP): Performed by: ANESTHESIOLOGY

## 2024-10-23 PROCEDURE — 86901 BLOOD TYPING SEROLOGIC RH(D): CPT | Performed by: UROLOGY

## 2024-10-23 PROCEDURE — 2500000004 HC RX 250 GENERAL PHARMACY W/ HCPCS (ALT 636 FOR OP/ED): Performed by: NURSE ANESTHETIST, CERTIFIED REGISTERED

## 2024-10-23 PROCEDURE — 2720000007 HC OR 272 NO HCPCS: Performed by: UROLOGY

## 2024-10-23 PROCEDURE — 2500000001 HC RX 250 WO HCPCS SELF ADMINISTERED DRUGS (ALT 637 FOR MEDICARE OP): Performed by: UROLOGY

## 2024-10-23 PROCEDURE — 7100000009 HC PHASE TWO TIME - INITIAL BASE CHARGE: Performed by: UROLOGY

## 2024-10-23 PROCEDURE — 2500000004 HC RX 250 GENERAL PHARMACY W/ HCPCS (ALT 636 FOR OP/ED): Performed by: UROLOGY

## 2024-10-23 PROCEDURE — 36415 COLL VENOUS BLD VENIPUNCTURE: CPT | Performed by: UROLOGY

## 2024-10-23 PROCEDURE — 2780000003 HC OR 278 NO HCPCS: Mod: MUE | Performed by: UROLOGY

## 2024-10-23 PROCEDURE — 3600000003 HC OR TIME - INITIAL BASE CHARGE - PROCEDURE LEVEL THREE: Performed by: UROLOGY

## 2024-10-23 PROCEDURE — 7100000010 HC PHASE TWO TIME - EACH INCREMENTAL 1 MINUTE: Performed by: UROLOGY

## 2024-10-23 PROCEDURE — 2500000004 HC RX 250 GENERAL PHARMACY W/ HCPCS (ALT 636 FOR OP/ED)

## 2024-10-23 PROCEDURE — A53445 PR INSERT,INFLATABLE SPHINCTER: Performed by: ANESTHESIOLOGY

## 2024-10-23 PROCEDURE — 3700000001 HC GENERAL ANESTHESIA TIME - INITIAL BASE CHARGE: Performed by: UROLOGY

## 2024-10-23 PROCEDURE — 3700000002 HC GENERAL ANESTHESIA TIME - EACH INCREMENTAL 1 MINUTE: Performed by: UROLOGY

## 2024-10-23 PROCEDURE — C1815 PROS, URINARY SPH, IMP: HCPCS | Mod: MUE | Performed by: UROLOGY

## 2024-10-23 PROCEDURE — 7100000002 HC RECOVERY ROOM TIME - EACH INCREMENTAL 1 MINUTE: Performed by: UROLOGY

## 2024-10-23 PROCEDURE — A53445 PR INSERT,INFLATABLE SPHINCTER

## 2024-10-23 PROCEDURE — 3600000008 HC OR TIME - EACH INCREMENTAL 1 MINUTE - PROCEDURE LEVEL THREE: Performed by: UROLOGY

## 2024-10-23 DEVICE — ACCESSORY KIT QUICK CONNECT WINDOW CONNECTORS (3 STRAIGHT, 2 RIGHT ANGLE, 1 Y), SUTURE-TIE CONNECTORS (3 STRAIGHT, 2 RIGHT ANGLE, 1 Y), 8 COLLETS, 1 COLLET HOLDER, 1 CUFF SIZER, 2 22-GAUGE BLUNT TIP NEEDLES, 2 15-GAUGE BLUNT TIP NEEDLES, 2 30 CM LENGTHS OF TUBING
Type: IMPLANTABLE DEVICE | Site: URETHRA | Status: NON-FUNCTIONAL
Brand: AMS 800 ARTIFICIAL URINARY SPHINCTER

## 2024-10-23 DEVICE — IMPLANTABLE DEVICE: Type: IMPLANTABLE DEVICE | Site: URETHRA | Status: FUNCTIONAL

## 2024-10-23 DEVICE — OCCLUSIVE CUFF WITH INHIBIZONE
Type: IMPLANTABLE DEVICE | Site: URETHRA | Status: FUNCTIONAL
Brand: AMS 800 ARTIFICIAL URINARY SPHINCTER

## 2024-10-23 RX ORDER — PROPOFOL 10 MG/ML
INJECTION, EMULSION INTRAVENOUS CONTINUOUS PRN
Status: DISCONTINUED | OUTPATIENT
Start: 2024-10-23 | End: 2024-10-23

## 2024-10-23 RX ORDER — GABAPENTIN 300 MG/1
600 CAPSULE ORAL ONCE
Status: COMPLETED | OUTPATIENT
Start: 2024-10-23 | End: 2024-10-23

## 2024-10-23 RX ORDER — SULFAMETHOXAZOLE AND TRIMETHOPRIM 800; 160 MG/1; MG/1
1 TABLET ORAL 2 TIMES DAILY
Qty: 10 TABLET | Refills: 0 | Status: SHIPPED | OUTPATIENT
Start: 2024-10-23 | End: 2024-10-28

## 2024-10-23 RX ORDER — VANCOMYCIN HYDROCHLORIDE 1 G/200ML
1000 INJECTION, SOLUTION INTRAVENOUS ONCE
Status: COMPLETED | OUTPATIENT
Start: 2024-10-23 | End: 2024-10-23

## 2024-10-23 RX ORDER — FLUCONAZOLE 2 MG/ML
200 INJECTION, SOLUTION INTRAVENOUS ONCE
Status: COMPLETED | OUTPATIENT
Start: 2024-10-23 | End: 2024-10-23

## 2024-10-23 RX ORDER — ACETAMINOPHEN 325 MG/1
650 TABLET ORAL ONCE
Status: COMPLETED | OUTPATIENT
Start: 2024-10-23 | End: 2024-10-23

## 2024-10-23 RX ORDER — LIDOCAINE HYDROCHLORIDE 20 MG/ML
INJECTION, SOLUTION INFILTRATION; PERINEURAL AS NEEDED
Status: DISCONTINUED | OUTPATIENT
Start: 2024-10-23 | End: 2024-10-23

## 2024-10-23 RX ORDER — CHLORHEXIDINE GLUCONATE 40 MG/ML
SOLUTION TOPICAL DAILY PRN
Status: DISCONTINUED | OUTPATIENT
Start: 2024-10-23 | End: 2024-10-23 | Stop reason: HOSPADM

## 2024-10-23 RX ORDER — OXYCODONE HYDROCHLORIDE 5 MG/1
5 TABLET ORAL EVERY 4 HOURS PRN
Status: DISCONTINUED | OUTPATIENT
Start: 2024-10-23 | End: 2024-10-23 | Stop reason: HOSPADM

## 2024-10-23 RX ORDER — ACETAMINOPHEN 325 MG/1
650 TABLET ORAL EVERY 6 HOURS PRN
Qty: 56 TABLET | Refills: 0 | Status: SHIPPED | OUTPATIENT
Start: 2024-10-23

## 2024-10-23 RX ORDER — OXYCODONE HYDROCHLORIDE 5 MG/1
5 TABLET ORAL EVERY 6 HOURS PRN
Qty: 8 TABLET | Refills: 0 | Status: SHIPPED | OUTPATIENT
Start: 2024-10-23

## 2024-10-23 RX ORDER — PHENYLEPHRINE HCL IN 0.9% NACL 0.4MG/10ML
SYRINGE (ML) INTRAVENOUS AS NEEDED
Status: DISCONTINUED | OUTPATIENT
Start: 2024-10-23 | End: 2024-10-23

## 2024-10-23 RX ORDER — POLYETHYLENE GLYCOL 3350 17 G/17G
17 POWDER, FOR SOLUTION ORAL DAILY
Qty: 7 PACKET | Refills: 0 | Status: SHIPPED | OUTPATIENT
Start: 2024-10-23 | End: 2024-10-30

## 2024-10-23 RX ORDER — SODIUM CHLORIDE, SODIUM LACTATE, POTASSIUM CHLORIDE, CALCIUM CHLORIDE 600; 310; 30; 20 MG/100ML; MG/100ML; MG/100ML; MG/100ML
20 INJECTION, SOLUTION INTRAVENOUS CONTINUOUS
Status: ACTIVE | OUTPATIENT
Start: 2024-10-23 | End: 2024-10-23

## 2024-10-23 RX ORDER — ONDANSETRON HYDROCHLORIDE 2 MG/ML
4 INJECTION, SOLUTION INTRAVENOUS ONCE AS NEEDED
Status: DISCONTINUED | OUTPATIENT
Start: 2024-10-23 | End: 2024-10-23 | Stop reason: HOSPADM

## 2024-10-23 RX ORDER — MIDAZOLAM HYDROCHLORIDE 1 MG/ML
INJECTION INTRAMUSCULAR; INTRAVENOUS CONTINUOUS PRN
Status: DISCONTINUED | OUTPATIENT
Start: 2024-10-23 | End: 2024-10-23

## 2024-10-23 RX ORDER — ALBUTEROL SULFATE 0.83 MG/ML
2.5 SOLUTION RESPIRATORY (INHALATION) ONCE AS NEEDED
Status: DISCONTINUED | OUTPATIENT
Start: 2024-10-23 | End: 2024-10-23 | Stop reason: HOSPADM

## 2024-10-23 RX ORDER — LABETALOL HYDROCHLORIDE 5 MG/ML
5 INJECTION, SOLUTION INTRAVENOUS ONCE AS NEEDED
Status: DISCONTINUED | OUTPATIENT
Start: 2024-10-23 | End: 2024-10-23 | Stop reason: HOSPADM

## 2024-10-23 RX ORDER — FENTANYL CITRATE 50 UG/ML
INJECTION, SOLUTION INTRAMUSCULAR; INTRAVENOUS CONTINUOUS PRN
Status: DISCONTINUED | OUTPATIENT
Start: 2024-10-23 | End: 2024-10-23

## 2024-10-23 RX ORDER — ACETAMINOPHEN 325 MG/1
325 TABLET ORAL EVERY 4 HOURS PRN
Status: DISCONTINUED | OUTPATIENT
Start: 2024-10-23 | End: 2024-10-23 | Stop reason: HOSPADM

## 2024-10-23 RX ORDER — SODIUM CHLORIDE, SODIUM LACTATE, POTASSIUM CHLORIDE, CALCIUM CHLORIDE 600; 310; 30; 20 MG/100ML; MG/100ML; MG/100ML; MG/100ML
20 INJECTION, SOLUTION INTRAVENOUS CONTINUOUS
Status: DISCONTINUED | OUTPATIENT
Start: 2024-10-23 | End: 2024-10-23 | Stop reason: HOSPADM

## 2024-10-23 RX ORDER — ONDANSETRON HYDROCHLORIDE 2 MG/ML
INJECTION, SOLUTION INTRAVENOUS AS NEEDED
Status: DISCONTINUED | OUTPATIENT
Start: 2024-10-23 | End: 2024-10-23

## 2024-10-23 SDOH — HEALTH STABILITY: MENTAL HEALTH: CURRENT SMOKER: 0

## 2024-10-23 ASSESSMENT — COLUMBIA-SUICIDE SEVERITY RATING SCALE - C-SSRS
2. HAVE YOU ACTUALLY HAD ANY THOUGHTS OF KILLING YOURSELF?: NO
6. HAVE YOU EVER DONE ANYTHING, STARTED TO DO ANYTHING, OR PREPARED TO DO ANYTHING TO END YOUR LIFE?: NO
1. IN THE PAST MONTH, HAVE YOU WISHED YOU WERE DEAD OR WISHED YOU COULD GO TO SLEEP AND NOT WAKE UP?: NO

## 2024-10-23 ASSESSMENT — PAIN SCALES - GENERAL
PAINLEVEL_OUTOF10: 3
PAINLEVEL_OUTOF10: 0 - NO PAIN
PAINLEVEL_OUTOF10: 3
PAINLEVEL_OUTOF10: 2
PAINLEVEL_OUTOF10: 4
PAINLEVEL_OUTOF10: 0 - NO PAIN
PAINLEVEL_OUTOF10: 5 - MODERATE PAIN
PAINLEVEL_OUTOF10: 5 - MODERATE PAIN
PAINLEVEL_OUTOF10: 0 - NO PAIN
PAINLEVEL_OUTOF10: 3

## 2024-10-23 ASSESSMENT — PAIN - FUNCTIONAL ASSESSMENT
PAIN_FUNCTIONAL_ASSESSMENT: 0-10

## 2024-10-23 ASSESSMENT — PAIN DESCRIPTION - LOCATION: LOCATION: ABDOMEN

## 2024-10-23 ASSESSMENT — PAIN DESCRIPTION - ORIENTATION: ORIENTATION: RIGHT;LOWER

## 2024-10-23 NOTE — OP NOTE
Urinary Sphincter Device Insertion Operative Note     Date: 10/23/2024  OR Location: [facility] A OR    Name: Federico Thorpe, : 1955, Age: 69 y.o., MRN: [id number], Sex: male    Diagnosis  Pre-op Diagnosis      * Male stress incontinence [N39.3]     * Malignant neoplasm of prostate (Multi) [C61] Post-op Diagnosis     * Male stress incontinence [N39.3]     * Malignant neoplasm of prostate (Multi) [C61]     Procedures  Urinary Sphincter Device Insertion  98047 - [place] INSJ INFLATABLE URETHRAL/BLADDER NECK SPHINCTER      Surgeons      * Jaime Samayoa - Primary    Resident/Fellow/Other Assistant:  Surgeons and Role:     * Serena Bailon MD - Resident - Assisting    Procedure Summary  Anesthesia: General  ASA: III  Anesthesia Staff: Anesthesiologist: Hussain Jones DO  C-AA: RAHEL Pollock  Estimated Blood Loss: 10 mL  Intra-op Medications:   Administrations occurring from 0900 to 1100 on 10/23/24:   Medication Name Total Dose   BUPivacaine HCl (Marcaine) 0.5 % (5 mg/mL) 29 mL, dexAMETHasone (Decadron) 4 mg syringe 30 mL   oxygen (O2) therapy Cannot be calculated   dexAMETHasone (Decadron) injection 4 mg/mL 8 mg   fentaNYL (Sublimaze) injection 50 mcg/mL 200 mcg   LR bolus Cannot be calculated   lidocaine (Xylocaine) injection 2 % 100 mg   midazolam PF (Versed) injection 1 mg/mL 1 mg   ondansetron 2 mg/mL 4 mg   phenylephrine 40 mcg/mL syringe 10 mL 300 mcg   propofol (Diprivan) injection 10 mg/mL 200 mg              Anesthesia Record               Intraprocedure I/O Totals       None           Specimen: No specimens collected     Staff:   Circulator: Samantha Mancilla Person: Leesa  Circulator: Barby Levine Circulator: Harmony      Implants:  Implants       Type Name Action Serial No.      Implant CUFF, OCCLUSION, URINARY [id number], 4.5CM, W/INHIBIZONE - [id number] Implanted               Findings:  cuff with InhibiZone, 4.0-cm cuff; 61-70 cm of water pressure-regulating balloons placed into the right  submuscular space; pump in the right  hemiscrotum; 24-mL fluid in the pressure-regulating balloon.      Indications: Federico Thorpe is an 69 y.o. male who is having surgery for Male stress incontinence [N39.3]  Malignant neoplasm of prostate (Multi) [C61]. Patient has a history of prostate cancer s/p prostatectomy in 2008 followed by radiation and ADT. He has developed severe stress urinary incontinence.     The patient was seen in the preoperative area. The risks, benefits, complications, treatment options, non-operative alternatives, expected recovery and outcomes were discussed with the patient. The possibilities of reaction to medication, pulmonary aspiration, injury to surrounding structures, bleeding, recurrent infection, the need for additional procedures, failure to diagnose a condition, and creating a complication requiring transfusion or operation were discussed with the patient. The patient concurred with the proposed plan, giving informed consent.  The site of surgery was properly noted/marked if necessary per policy. The patient has been actively warmed in preoperative area. Preoperative antibiotics have been ordered and given within 1 hours of incision. Venous thrombosis prophylaxis have been ordered including bilateral sequential compression devices    Procedure Details: The indications, alternatives, benefits, and risks were discussed with the patient and informed consent was obtained. The patient was brought onto the operating room table, positioned supine, and secured with a  safety strap. Pneumatic compression devices were placed on the lower extremities.     After the administration of intravenous antibiotics and general anesthesia, the patient was repositioned in dorsal lithotomy using well-padded universal stirrups. The genitalia, perineum, and lower abdomen were prepped and draped in the standard sterile manner. A time-out was completed, verifying the correct patient, surgical procedure, and  positioning, prior to beginning the procedure.       The three components of the artificial urinary sphincter were appropriately prepared according to the ’s specifications and soaked in sterile antibiotic solution. Rubber-shod hemostats were used on the tubing to avoid inadvertent injury to the device. A 16F urethral catheter was placed and the bladder drained.     A vertical midline perineal skin incision was made, extending from the base of the scrotum to 3 cm above the anus. Colles’ fascia and the subcutaneous tissues were incised with electrocautery, exposing the bulbospongiosus muscle. The muscle was sharply divided in  the midline and gently dissected off the underlying corpus spongiosum, taking care not to perforate its thin tunica albuginea. A [place] was appropriately positioned to optimize exposure.      The proximal bulbar urethra was carefully dissected circumferentially, freeing it dorsally from the corporal  bodies for a distance of 2 cm. A vessel loop was placed through this tunnel to facilitate the urethra’s mobility. The cuff-sizer was positioned snugly around the urethra at this level and measured. An  AUS with 4.0 cm urethral cuff was chosen and passed behind the urethra with the mesh surface on the outside. Using the tab, the urethral cuff was secured ventrally, confirming a snug fit. Hemostasis was achieved with judicious electrocautery.      A 3cm transverse right inguinal incision was made following Pal’s lines and carried down through to the external oblique aponeurosis. This was incised sharply and a stay suture was placed on each side of the fascia using 2-0 PDS. Then, the underlying internal oblique and transversalis abdominis muscles were gently , entering a space where a small pocket was created. The 61-70cm H20 pressure-regulating balloon was positioned in this space and filled with 24ml of sterile normal saline. The tubing was occluded with a  rubber-shod hemostat and the overlying abdominal wall fascia was closed around the exiting tubing to avoid herniation, using the 2-0 PDS stay sutures.     Using a curved tubing passer, the urethral cuff tubing was passed from the perineal to the inguinal incision, staying close to the pubic bone. A sponge stick was placed through the inguinal incision and guided inferiorly to the right hemiscrotum. A dartos pouch was created and the scrotal pump placed within this, away from the testis, with its activation button facing the skin.  The excess tubing was trimmed and the ends flushed with sterile normal saline. The urethral cuff and pressure regulating balloon tubing were connected to the scrotal pump tubing using the quick-connectors, and the rubber-shod hemostats were removed. The device was cycled through the activation and deactivation phases, ensuring a secure an airtight connection and proper function.      Having completed the artificial urinary sphincter placement, hemostasis was obtained and the self-retaining retractor was removed. The perineal wound was irrigated with warm sterile normal saline and closed using a running 2-0 Vicryl suture to approximate the bulbospongiosus muscle and a 3-0 Vicryl suture for Colles’ fascia. Running 4-0 Monocryl sutures were used on the skin. The inguinal incision was closed using running a deep 2-0 vicryl followed by a 4-0 Stratafix for skin. The arrington catheter was then removed. Both incisions were cleaned and dried and dermabond was applied. The inguinal incision was then covered with an island dressing. The perineal incision was covered with a sterile dressing and gauze fluffs, and an athletic supporter applied to help minimize swelling. The patient was repositioned supine position, awoken from anesthesia and transferred to a stretcher.      The patient tolerated the procedure well and was taken to the recovery room in satisfactory condition.    Complications:  None;  patient tolerated the procedure well.    Disposition: PACU - hemodynamically stable.  Condition: stable         Additional Details: n/a    Attending Attestation: I was present and scrubbed for the entire procedure.    Jaime Samayoa  Phone Number: [phone]

## 2024-10-23 NOTE — ANESTHESIA PREPROCEDURE EVALUATION
Patient: Federico Thorpe    Procedure Information       Date/Time: 10/23/24 0900    Procedures:       Urinary Sphincter Device Insertion      Cystoscopy    Location: [facility] OR 01 / [facility] OR    Surgeons: Jaime Samayoa MD            Relevant Problems   Anesthesia  PONV 40 years ago with ankle surgery      Cardiac   (+) Hyperlipidemia   (+) Hypertension      Pulmonary   (+) FLAKITA on CPAP (CPAP)      Neuro  Arachnoid cyst - asymptomatic      GI (within normal limits)      /Renal   (+) Malignant neoplasm of prostate (Multi)      Endocrine   (+) Severe obesity (BMI 35.0-39.9) with comorbidity (Multi)      Musculoskeletal   (+) Osteoarthritis of lumbar spine   (+) Primary osteoarthritis of both ankles     There were no vitals filed for this visit.    Past Surgical History:   Procedure Laterality Date    ANKLE FRACTURE SURGERY Right 1984    COLONOSCOPY  04/16/2020    PROSTATE SURGERY  2010    Prostate Surgery     Past Medical History:   Diagnosis Date    Arachnoid cyst 07/06/2021    Stable per head CT    Arthritis 01/23/2023    Bilateral edema of lower extremity 01/23/2023    on HCTZ, compression stockings    Bradycardia     resolved after stopping metoprolol    Chronic pain disorder     long history of back pain    ED (erectile dysfunction)     Hallux valgus with bunions 01/23/2023    Hyperlipidemia     Hypertension     Macular degeneration     mild    Obesity (BMI 30.0-34.9)     Obesity, unspecified 05/04/2022    Class 2 obesity with body mass index (BMI) of 37.0 to 37.9 in adult    Persistent dry cough 03/07/2023    PONV (postoperative nausea and vomiting)     Prostate cancer (Multi)     radiation compleyed 2/2020    Sleep apnea     setting= 12.5, uses nightly    Stress incontinence, male     Vitamin D deficiency        Current Facility-Administered Medications:     chlorhexidine (Hibiclens) 4 % liquid, , Topical, Daily PRN, Jaime Samayoa MD    fluconazole (Diflucan) 200 mg in sodium chloride (iso)  mL, 200  mg, intravenous, Once, Jaime Samayoa MD    lactated Ringer's infusion, 20 mL/hr, intravenous, Continuous, Jaime Samayoa MD    piperacillin-tazobactam (Zosyn) 3.375 g in dextrose (iso) IV 50 mL, 3.375 g, intravenous, Once, Jaime Samayoa MD, 3.375 g at 10/23/24 0804    vancomycin (Vancocin) 1,000 mg in dextrose 5%  mL, 1,000 mg, intravenous, Once, Jaime Samayoa MD, Last Rate: 200 mL/hr at 10/23/24 0804, 1,000 mg at 10/23/24 0804  Prior to Admission medications    Medication Sig Start Date End Date Taking? Authorizing Provider   acetaminophen (Tylenol) 500 mg tablet Take 2 tablets (1,000 mg) by mouth every 6 hours if needed for mild pain (1 - 3).   Yes Historical Provider, MD   atorvastatin (Lipitor) 40 mg tablet Take 1 tablet (40 mg) by mouth once daily. 3/7/23 10/23/24 Yes Lo Al MD   chlorhexidine (Peridex) 0.12 % solution Swish for 30 seconds and spit 15mL of solution the night before and morning of surgery 10/15/24  Yes HERMANN Pat-CNP   cholecalciferol (Vitamin D-3) 25 MCG (1000 UT) tablet Take 1 tablet (25 mcg) by mouth once daily. 1/28/21  Yes Historical Provider, MD   DULoxetine (Cymbalta) 60 mg DR capsule Take 1 capsule (60 mg) by mouth once daily.   Yes Historical Provider, MD   lisinopriL-hydrochlorothiazide 20-12.5 mg tablet Take 1 tablet by mouth once daily. 11/11/23  Yes Historical Provider, MD   MULTIVITAMIN ORAL    Yes Historical Provider, MD   tadalafil (Cialis) 5 mg tablet Take 1 tablet (5 mg) by mouth once daily. 7/24/24 7/24/25 Yes Holly Sepulveda MD   TURMERIC ORAL Take 1 capsule by mouth 3 times a day.   Yes Historical Provider, MD   vit C-E-cupric-zinc-lutein (PreserVision Lutein) 226-90-0.8-5 mg capsule capsule TAKE AS DIRECTED. 7/6/22  Yes Historical Provider, MD   furosemide (Lasix) 40 mg tablet TAKE 1 TABLET BY MOUTH DAILY AS  DIRECTED  Patient not taking: Reported on 10/9/2024 6/26/23 10/23/24  Lo Al MD     No Known Allergies  Social History     Tobacco  Use    Smoking status: Never    Smokeless tobacco: Never    Tobacco comments:     never smoker, little etoh , no illicit drugs    Substance Use Topics    Alcohol use: Not Currently         Chemistry    Lab Results   Component Value Date/Time     10/15/2024 1656    K 4.3 10/15/2024 1656     10/15/2024 1656    CO2 31 10/15/2024 1656    BUN 15 10/15/2024 1656    CREATININE 0.78 10/15/2024 1656    Lab Results   Component Value Date/Time    CALCIUM 9.2 10/15/2024 1656    ALKPHOS 56 11/21/2022 1154    AST 19 11/21/2022 1154    ALT 19 11/21/2022 1154    BILITOT 0.6 11/21/2022 1154          Lab Results   Component Value Date/Time    WBC 6.0 10/15/2024 1656    HGB 15.0 10/15/2024 1656    HCT 45.2 10/15/2024 1656     10/15/2024 1656     Lab Results   Component Value Date/Time    PROTIME 10.8 10/15/2024 1656    INR 1.0 10/15/2024 1656     Echo 2021  PHYSICIAN INTERPRETATION:  Left Ventricle: The left ventricular systolic function is hyperdynamic, with an estimated ejection fraction of 65-70%. There are no regional wall motion abnormalities. The left ventricular cavity size is normal. There is concentric left ventricular hypertrophy. Spectral Doppler shows a normal pattern of left ventricular diastolic filling.  Left Atrium: The left atrium is mildly dilated.  Right Ventricle: The right ventricle is normal in size. There is normal right ventricular global systolic function.  Right Atrium: The right atrium is normal in size.  Aortic Valve: The aortic valve is trileaflet. There is no evidence of aortic valve stenosis.  There is trivial aortic valve regurgitation. The peak instantaneous gradient of the aortic valve is 14.5 mmHg. The mean gradient of the aortic valve is 7.6 mmHg.  Mitral Valve: The mitral valve is normal in structure. There is trace to mild mitral valve regurgitation.  Tricuspid Valve: The tricuspid valve is structurally normal. There is trace to mild tricuspid regurgitation.  Pulmonic Valve: The  pulmonic valve is structurally normal. There is no indication of pulmonic valve regurgitation.  Pericardium: There is no pericardial effusion noted.  Aorta: The aortic root is normal.  Pulmonary Artery: The tricuspid regurgitant velocity is 2.76 m/s, and with an estimated right atrial pressure of 10 mmHg, the estimated pulmonary artery pressure is mildly elevated with the RVSP at 40.5 mmHg.  Systemic Veins: The inferior vena cava appears to be of normal size.        CONCLUSIONS:   1. The left ventricular systolic function is hyperdynamic with a 65-70% estimated ejection fraction.   2. There is trace-mild mitral and tricuspid regurgitation.   3. The estimated pulmonary artery pressure is mildly elevated with the RVSP at 40.5 mmHg.      Clinical information reviewed:   Tobacco  Allergies  Meds   Med Hx  Surg Hx   Fam Hx  Soc Hx        NPO Detail:  NPO/Void Status  Date of Last Liquid: 10/23/24  Time of Last Liquid: 0000  Date of Last Solid: 10/22/24  Time of Last Solid: 1700  Last Intake Type: Clear fluids  Time of Last Void: 0715         Physical Exam    Airway  Mallampati: I  TM distance: >3 FB  Neck ROM: full     Cardiovascular   Rhythm: regular     Dental        Pulmonary   Breath sounds clear to auscultation     Abdominal            Anesthesia Plan    History of general anesthesia?: yes  History of complications of general anesthesia?: no    ASA 3     general   (ASA monitors)  The patient is not a current smoker.  Education provided regarding risk of obstructive sleep apnea. (Will use CPAP)  intravenous induction   Postoperative administration of opioids is intended.  Trial extubation is planned.  Anesthetic plan and risks discussed with patient.  Use of blood products discussed with patient who consented to blood products.    Plan discussed with CAA.

## 2024-10-23 NOTE — PERIOPERATIVE NURSING NOTE
1200 Arrival to Phase 2. Awake and alert. Denies pain. Wife and daughter brought to bedside. Discharge instructions reviewed with pt and wife, verbalized understanding. PIVs removed and pt getting dressed.     1240 Transported to [facility] via wheelchair and discharged to home with wife and daughter.

## 2024-10-23 NOTE — ANESTHESIA PROCEDURE NOTES
Airway  Date/Time: 10/23/2024 9:12 AM  Urgency: elective    Airway not difficult    Staffing  Performed: RAHEL   Authorized by: Hussain Jones DO    Performed by: RAHEL Pollock  Patient location during procedure: OR    Indications and Patient Condition  Indications for airway management: anesthesia  Sedation level: deep  Preoxygenated: yes  Patient position: sniffing  MILS maintained throughout  Mask difficulty assessment: 0 - not attempted    Final Airway Details  Final airway type: supraglottic airway      Successful airway: classic  Size 5      Additional Comments  I-gel placed, atraumatic

## 2024-10-23 NOTE — ANESTHESIA POSTPROCEDURE EVALUATION
Patient: Federico Thorpe    Procedure Summary       Date: 10/23/24 Room / Location: [facility] OR 01 / [facility] OR    Anesthesia Start: 0902 Anesthesia Stop: 1057    Procedure: Urinary Sphincter Device Insertion (Urethra) Diagnosis:       Male stress incontinence      Malignant neoplasm of prostate (Multi)      (Male stress incontinence [N39.3])      (Malignant neoplasm of prostate (Multi) [C61])    Surgeons: Jaime Samayoa MD Responsible Provider: Hussain Jones DO    Anesthesia Type: general ASA Status: 3            Anesthesia Type: general    Vitals Value Taken Time   /77 10/23/24 1153   Temp 36.3 °C (97.3 °F) 10/23/24 1053   Pulse 57 10/23/24 1158   Resp 18 10/23/24 1145   SpO2 100 % 10/23/24 1158   Vitals shown include unfiled device data.    Anesthesia Post Evaluation    Patient location during evaluation: PACU  Patient participation: complete - patient participated  Level of consciousness: awake and alert  Pain management: adequate  Airway patency: patent  Cardiovascular status: acceptable and blood pressure returned to baseline  Respiratory status: acceptable  Hydration status: acceptable  Postoperative Nausea and Vomiting: none        No notable events documented.

## 2024-11-04 ENCOUNTER — APPOINTMENT (OUTPATIENT)
Dept: UROLOGY | Facility: HOSPITAL | Age: 69
End: 2024-11-04
Payer: MEDICARE

## 2024-12-05 ENCOUNTER — APPOINTMENT (OUTPATIENT)
Dept: UROLOGY | Facility: HOSPITAL | Age: 69
End: 2024-12-05
Payer: COMMERCIAL

## 2024-12-12 ENCOUNTER — OFFICE VISIT (OUTPATIENT)
Dept: UROLOGY | Facility: HOSPITAL | Age: 69
End: 2024-12-12
Payer: COMMERCIAL

## 2024-12-12 DIAGNOSIS — N39.3 MALE STRESS INCONTINENCE: Primary | ICD-10-CM

## 2024-12-12 DIAGNOSIS — Z96.0 STATUS POST IMPLANTATION OF ARTIFICIAL URINARY SPHINCTER: ICD-10-CM

## 2024-12-12 PROCEDURE — 99211 OFF/OP EST MAY X REQ PHY/QHP: CPT | Performed by: UROLOGY

## 2024-12-12 PROCEDURE — 1159F MED LIST DOCD IN RCRD: CPT | Performed by: UROLOGY

## 2024-12-12 NOTE — PROGRESS NOTES
FUV     HISTORY OF PRESENT ILLNESS:   Federico Thorpe is a 69 y.o. male who is being seen today for fuv    67 yo male with prostate cancer, was diagnosed in 2008 by Dr. Brasher, for which he underwent a prostatectomy, GG1, Jeff score 3+3=6, on path. He completed XRT, and received one ADT injection for a BCR years later. PSA undetectable. F/U stress urinary incontinence, tried oxybutynin for 2 weeks, but stopped due to dizziness.     Pt had AUS on 10/23/24   cuff with InhibiZone, 4.0-cm cuff; 61-70 cm of water pressure-regulating balloons placed into the right submuscular space; pump in the right  hemiscrotum; 24-mL fluid in the pressure-regulating balloon     Doing well.  No f/c.  No significant pain  Past Medical History:   Diagnosis Date    Arachnoid cyst 07/06/2021    Stable per head CT    Arthritis 01/23/2023    Bilateral edema of lower extremity 01/23/2023    on HCTZ, compression stockings    Bradycardia     resolved after stopping metoprolol    Chronic pain disorder     long history of back pain    ED (erectile dysfunction)     Hallux valgus with bunions 01/23/2023    Hyperlipidemia     Hypertension     Macular degeneration     mild    Obesity (BMI 30.0-34.9)     Obesity, unspecified 05/04/2022    Class 2 obesity with body mass index (BMI) of 37.0 to 37.9 in adult    Persistent dry cough 03/07/2023    PONV (postoperative nausea and vomiting)     Prostate cancer (Multi)     radiation compleyed 2/2020    Sleep apnea     setting= 12.5, uses nightly    Stress incontinence, male     Vitamin D deficiency      Past Surgical History:   Procedure Laterality Date    ANKLE FRACTURE SURGERY Right 1984    COLONOSCOPY  04/16/2020    PROSTATE SURGERY  2010    Prostate Surgery     Social History     Tobacco Use    Smoking status: Never    Smokeless tobacco: Never    Tobacco comments:     never smoker, little etoh , no illicit drugs    Substance Use Topics    Alcohol use: Not Currently     Family History   Problem  Relation Name Age of Onset    Heart disease Mother      Hypertension Father      Heart disease Father       [unfilled]  Current Outpatient Medications on File Prior to Visit   Medication Sig Dispense Refill    acetaminophen (Tylenol) 325 mg tablet Take 2 tablets (650 mg) by mouth every 6 hours if needed for mild pain (1 - 3). 56 tablet 0    acetaminophen (Tylenol) 500 mg tablet Take 2 tablets (1,000 mg) by mouth every 6 hours if needed for mild pain (1 - 3).      atorvastatin (Lipitor) 40 mg tablet Take 1 tablet (40 mg) by mouth once daily. 90 tablet 3    cholecalciferol (Vitamin D-3) 25 MCG (1000 UT) tablet Take 1 tablet (25 mcg) by mouth once daily.      DULoxetine (Cymbalta) 60 mg DR capsule Take 1 capsule (60 mg) by mouth once daily.      lisinopriL-hydrochlorothiazide 20-12.5 mg tablet Take 1 tablet by mouth once daily.      MULTIVITAMIN ORAL       oxyCODONE (Roxicodone) 5 mg immediate release tablet Take 1 tablet (5 mg) by mouth every 6 hours if needed for severe pain (7 - 10). 8 tablet 0    tadalafil (Cialis) 5 mg tablet Take 1 tablet (5 mg) by mouth once daily. 30 tablet 11    TURMERIC ORAL Take 1 capsule by mouth 3 times a day.      vit C-E-cupric-zinc-lutein (PreserVision Lutein) 226-90-0.8-5 mg capsule capsule TAKE AS DIRECTED.       No current facility-administered medications on file prior to visit.     Federico has No Known Allergies.     Review of Systems    14 point ROS reviewed and discussed with the patient. Pertinent positives/negatives discussed in the History of Present Illness (HPI).      FH:  Prostate CA: Yes- self   Bladder CA: No  Kidney CA: No  Stones: No    Social History  Occupation:    Tob: No  Etoh: Yes- not very often       Objective:   Physical Exam   1. Constitutional: NAD, Well-developed, Well-nourished  2. Respiratory: Unlabored breathing, no audible wheezes, no use of accessory muscles   3. Cardiovascular: No JVD, extremities perfused, no edema  4. Abdomen: Soft,  non-tender, non-distended, no masses or hernia.  No CVA tenderness.    5. Skin: no visible lesions, plaque, rashes, jaundice  6. Neuro: Gait normal, no focal neurologic deficit  7. Psychiatric: Mood and affect normal and appropriate, alert and oriented  . Pump in right hemiscrotum, cycled and activated    Labs  Lab Results   Component Value Date    TESTOSTERONE 436 01/15/2022    TESTOSTERONE <60 (L) 10/14/2021    TESTOSTERONE <60 (L) 07/17/2021     Lab Results   Component Value Date    PSA <0.10 07/10/2024     Lab Results   Component Value Date    GFRMALE >90 03/24/2023     Lab Results   Component Value Date    CREATININE 0.78 10/15/2024     Lab Results   Component Value Date    CHOL 130 11/21/2022     Lab Results   Component Value Date    HDL 54.2 11/21/2022     Lab Results   Component Value Date    CHHDL 2.4 11/21/2022     Lab Results   Component Value Date    LDLF 65 11/21/2022     Lab Results   Component Value Date    VLDL 10 11/21/2022     Lab Results   Component Value Date    TRIG 52 11/21/2022     Lab Results   Component Value Date    HGBA1C 5.4 10/23/2020     Lab Results   Component Value Date    TESTF 1.8 (L) 04/12/2021     Lab Results   Component Value Date    HCT 45.2 10/15/2024       Assessment/Plan:   Federico WHATLEY Ila presents with     Doing well  Activated AUS  TO void every few hours  Warning signs discussed    FU in 3m, sooner if needed

## 2025-03-13 ENCOUNTER — APPOINTMENT (OUTPATIENT)
Dept: UROLOGY | Facility: HOSPITAL | Age: 70
End: 2025-03-13
Payer: COMMERCIAL

## 2025-03-13 DIAGNOSIS — C61 MALIGNANT NEOPLASM OF PROSTATE (MULTI): ICD-10-CM

## 2025-03-13 DIAGNOSIS — Z96.0 STATUS POST IMPLANTATION OF ARTIFICIAL URINARY SPHINCTER: ICD-10-CM

## 2025-03-13 DIAGNOSIS — N40.0 BENIGN PROSTATIC HYPERPLASIA, UNSPECIFIED WHETHER LOWER URINARY TRACT SYMPTOMS PRESENT: Primary | ICD-10-CM

## 2025-03-13 LAB
POC APPEARANCE, URINE: CLEAR
POC BILIRUBIN, URINE: NEGATIVE
POC BLOOD, URINE: NEGATIVE
POC COLOR, URINE: YELLOW
POC GLUCOSE, URINE: NEGATIVE MG/DL
POC KETONES, URINE: NEGATIVE MG/DL
POC LEUKOCYTES, URINE: NEGATIVE
POC NITRITE,URINE: NEGATIVE
POC PH, URINE: 6 PH
POC PROTEIN, URINE: NEGATIVE MG/DL
POC SPECIFIC GRAVITY, URINE: 1.02
POC UROBILINOGEN, URINE: 0.2 EU/DL

## 2025-03-13 PROCEDURE — 81003 URINALYSIS AUTO W/O SCOPE: CPT | Performed by: UROLOGY

## 2025-03-13 PROCEDURE — 1159F MED LIST DOCD IN RCRD: CPT | Performed by: UROLOGY

## 2025-03-13 PROCEDURE — 99213 OFFICE O/P EST LOW 20 MIN: CPT | Performed by: UROLOGY

## 2025-03-13 NOTE — LETTER
"March 13, 2025     Holly Sepulveda MD  [id number] [facility] [address] [address]    Patient: Federico Thorpe   YOB: 1955   Date of Visit: 3/13/2025       Dear Dr. Holly Sepulveda MD:    Thank you for referring Federico Thorpe to me for evaluation. Below are my notes for this consultation.  If you have questions, please do not hesitate to call me. I look forward to following your patient along with you.       Sincerely,     Jaime Samayoa MD      CC: No Recipients  ______________________________________________________________________________________    FUV     HISTORY OF PRESENT ILLNESS:   Federico Thorpe is a 70 y.o. male who is being seen today for fuv    67 yo male with prostate cancer, was diagnosed in 2008 by Dr. Brasher, for which he underwent a prostatectomy, GG1, Jeff score 3+3=6, on path. He completed XRT, and received one ADT injection for a BCR years later. PSA undetectable. F/U stress urinary incontinence, tried oxybutynin for 2 weeks, but stopped due to dizziness.     Pt had AUS on 10/23/24   cuff with InhibiZone, 4.0-cm cuff; 61-70 cm of water pressure-regulating balloons placed into the right submuscular space; pump in the right  hemiscrotum; 24-mL fluid in the pressure-regulating balloon     12/12/24 - Doing well.  No f/c.  No significant pain. Activated device    3/13/25 - \"I love it\" Wears a safety pad but rarely needs it.      Past Medical History:   Diagnosis Date   • Arachnoid cyst 07/06/2021    Stable per head CT   • Arthritis 01/23/2023   • Bilateral edema of lower extremity 01/23/2023    on HCTZ, compression stockings   • Bradycardia     resolved after stopping metoprolol   • Chronic pain disorder     long history of back pain   • ED (erectile dysfunction)    • Hallux valgus with bunions 01/23/2023   • Hyperlipidemia    • Hypertension    • Macular degeneration     mild   • Obesity (BMI 30.0-34.9)    • Obesity, unspecified 05/04/2022    Class 2 " obesity with body mass index (BMI) of 37.0 to 37.9 in adult   • Persistent dry cough 03/07/2023   • PONV (postoperative nausea and vomiting)    • Prostate cancer (Multi)     radiation compleyed 2/2020   • Sleep apnea     setting= 12.5, uses nightly   • Stress incontinence, male    • Vitamin D deficiency      Past Surgical History:   Procedure Laterality Date   • ANKLE FRACTURE SURGERY Right 1984   • COLONOSCOPY  04/16/2020   • PROSTATE SURGERY  2010    Prostate Surgery     Social History     Tobacco Use   • Smoking status: Never   • Smokeless tobacco: Never   • Tobacco comments:     never smoker, little etoh , no illicit drugs    Substance Use Topics   • Alcohol use: Not Currently     Family History   Problem Relation Name Age of Onset   • Heart disease Mother     • Hypertension Father     • Heart disease Father       Current Outpatient Medications on File Prior to Visit   Medication Sig Dispense Refill   • acetaminophen (Tylenol) 325 mg tablet Take 2 tablets (650 mg) by mouth every 6 hours if needed for mild pain (1 - 3). 56 tablet 0   • acetaminophen (Tylenol) 500 mg tablet Take 2 tablets (1,000 mg) by mouth every 6 hours if needed for mild pain (1 - 3).     • atorvastatin (Lipitor) 40 mg tablet Take 1 tablet (40 mg) by mouth once daily. 90 tablet 3   • cholecalciferol (Vitamin D-3) 25 MCG (1000 UT) tablet Take 1 tablet (25 mcg) by mouth once daily.     • DULoxetine (Cymbalta) 60 mg DR capsule Take 1 capsule (60 mg) by mouth once daily.     • lisinopriL-hydrochlorothiazide 20-12.5 mg tablet Take 1 tablet by mouth once daily.     • MULTIVITAMIN ORAL      • oxyCODONE (Roxicodone) 5 mg immediate release tablet Take 1 tablet (5 mg) by mouth every 6 hours if needed for severe pain (7 - 10). 8 tablet 0   • tadalafil (Cialis) 5 mg tablet Take 1 tablet (5 mg) by mouth once daily. 30 tablet 11   • TURMERIC ORAL Take 1 capsule by mouth 3 times a day.     • vit C-E-cupric-zinc-lutein (PreserVision Lutein) 226-90-0.8-5 mg  capsule capsule TAKE AS DIRECTED.       No current facility-administered medications on file prior to visit.     Federico has No Known Allergies.     Review of Systems    14 point ROS reviewed and discussed with the patient. Pertinent positives/negatives discussed in the History of Present Illness (HPI).    FH:  Prostate CA: Yes- self   Bladder CA: No  Kidney CA: No  Stones: No    Social History  Occupation:    Tob: No  Etoh: Yes- not very often     Objective:   Physical Exam   1. Constitutional: NAD, Well-developed, Well-nourished  2. Respiratory: Unlabored breathing, no audible wheezes, no use of accessory muscles   3. Cardiovascular: No JVD, extremities perfused, no edema  4. Abdomen: Soft, non-tender, non-distended, no masses or hernia.  No CVA tenderness.    5. Skin: no visible lesions, plaque, rashes, jaundice  6. Neuro: Gait normal, no focal neurologic deficit  7. Psychiatric: Mood and affect normal and appropriate, alert and oriented  . Pump in right hemiscrotum    Labs  Lab Results   Component Value Date    TESTOSTERONE 436 01/15/2022    TESTOSTERONE <60 (L) 10/14/2021    TESTOSTERONE <60 (L) 07/17/2021     Lab Results   Component Value Date    PSA <0.10 07/10/2024     Lab Results   Component Value Date    GFRMALE >90 03/24/2023     Lab Results   Component Value Date    CREATININE 0.78 10/15/2024     Lab Results   Component Value Date    CHOL 130 11/21/2022     Lab Results   Component Value Date    HDL 54.2 11/21/2022     Lab Results   Component Value Date    CHHDL 2.4 11/21/2022     Lab Results   Component Value Date    LDLF 65 11/21/2022     Lab Results   Component Value Date    VLDL 10 11/21/2022     Lab Results   Component Value Date    TRIG 52 11/21/2022     Lab Results   Component Value Date    HGBA1C 5.4 10/23/2020     Lab Results   Component Value Date    TESTF 1.8 (L) 04/12/2021     Lab Results   Component Value Date    HCT 45.2 10/15/2024   UA neg  PVR 0    Assessment/Plan:   Federico WHATLEY  Ila presents with     Doing well, very happy with AUS  Cont fu with Dr Sepulveda  this summer    See me as needed

## 2025-03-13 NOTE — PROGRESS NOTES
"FUV     HISTORY OF PRESENT ILLNESS:   Federico Thorpe is a 70 y.o. male who is being seen today for fuv    67 yo male with prostate cancer, was diagnosed in 2008 by Dr. Brasher, for which he underwent a prostatectomy, GG1, [place] score 3+3=6, on path. He completed XRT, and received one ADT injection for a BCR years later. PSA undetectable. F/U stress urinary incontinence, tried oxybutynin for 2 weeks, but stopped due to dizziness.     Pt had AUS on 10/23/24   cuff with InhibiZone, 4.0-cm cuff; 61-70 cm of water pressure-regulating balloons placed into the right submuscular space; pump in the right  hemiscrotum; 24-mL fluid in the pressure-regulating balloon     12/12/24 - Doing well.  No f/c.  No significant pain. Activated device    3/13/25 - \"I love it\" Wears a safety pad but rarely needs it.      Past Medical History:   Diagnosis Date    Arachnoid cyst 07/06/2021    Stable per head CT    Arthritis 01/23/2023    Bilateral edema of lower extremity 01/23/2023    on HCTZ, compression stockings    Bradycardia     resolved after stopping metoprolol    Chronic pain disorder     long history of back pain    ED (erectile dysfunction)     Hallux valgus with bunions 01/23/2023    Hyperlipidemia     Hypertension     Macular degeneration     mild    Obesity (BMI 30.0-34.9)     Obesity, unspecified 05/04/2022    Class 2 obesity with body mass index (BMI) of 37.0 to 37.9 in adult    Persistent dry cough 03/07/2023    PONV (postoperative nausea and vomiting)     Prostate cancer (Multi)     radiation compleyed 2/2020    Sleep apnea     setting= 12.5, uses nightly    Stress incontinence, male     Vitamin D deficiency      Past Surgical History:   Procedure Laterality Date    ANKLE FRACTURE SURGERY Right 1984    COLONOSCOPY  04/16/2020    PROSTATE SURGERY  2010    Prostate Surgery     Social History     Tobacco Use    Smoking status: Never    Smokeless tobacco: Never    Tobacco comments:     never smoker, little etoh , no " illicit drugs    Substance Use Topics    Alcohol use: Not Currently     Family History   Problem Relation Name Age of Onset    Heart disease Mother      Hypertension Father      Heart disease Father       Current Outpatient Medications on File Prior to Visit   Medication Sig Dispense Refill    acetaminophen (Tylenol) 325 mg tablet Take 2 tablets (650 mg) by mouth every 6 hours if needed for mild pain (1 - 3). 56 tablet 0    acetaminophen (Tylenol) 500 mg tablet Take 2 tablets (1,000 mg) by mouth every 6 hours if needed for mild pain (1 - 3).      atorvastatin (Lipitor) 40 mg tablet Take 1 tablet (40 mg) by mouth once daily. 90 tablet 3    cholecalciferol (Vitamin D-3) 25 MCG (1000 UT) tablet Take 1 tablet (25 mcg) by mouth once daily.      DULoxetine (Cymbalta) 60 mg DR capsule Take 1 capsule (60 mg) by mouth once daily.      lisinopriL-hydrochlorothiazide 20-12.5 mg tablet Take 1 tablet by mouth once daily.      MULTIVITAMIN ORAL       oxyCODONE (Roxicodone) 5 mg immediate release tablet Take 1 tablet (5 mg) by mouth every 6 hours if needed for severe pain (7 - 10). 8 tablet 0    tadalafil (Cialis) 5 mg tablet Take 1 tablet (5 mg) by mouth once daily. 30 tablet 11    TURMERIC ORAL Take 1 capsule by mouth 3 times a day.      vit C-E-cupric-zinc-lutein (PreserVision Lutein) 226-90-0.8-5 mg capsule capsule TAKE AS DIRECTED.       No current facility-administered medications on file prior to visit.     Federico has No Known Allergies.     Review of Systems    14 point ROS reviewed and discussed with the patient. Pertinent positives/negatives discussed in the History of Present Illness (HPI).    FH:  Prostate CA: Yes- self   Bladder CA: No  Kidney CA: No  Stones: No    Social History  Occupation:    Tob: No  Etoh: Yes- not very often     Objective:   Physical Exam   1. Constitutional: NAD, Well-developed, Well-nourished  2. Respiratory: Unlabored breathing, no audible wheezes, no use of accessory muscles   3.  Cardiovascular: No JVD, extremities perfused, no edema  4. Abdomen: Soft, non-tender, non-distended, no masses or hernia.  No CVA tenderness.    5. Skin: no visible lesions, plaque, rashes, jaundice  6. Neuro: Gait normal, no focal neurologic deficit  7. Psychiatric: Mood and affect normal and appropriate, alert and oriented  . Pump in right hemiscrotum    Labs  Lab Results   Component Value Date    TESTOSTERONE 436 01/15/2022    TESTOSTERONE <60 (L) 10/14/2021    TESTOSTERONE <60 (L) 07/17/2021     Lab Results   Component Value Date    PSA <0.10 07/10/2024     Lab Results   Component Value Date    GFRMALE >90 03/24/2023     Lab Results   Component Value Date    CREATININE 0.78 10/15/2024     Lab Results   Component Value Date    CHOL 130 11/21/2022     Lab Results   Component Value Date    HDL 54.2 11/21/2022     Lab Results   Component Value Date    CHHDL 2.4 11/21/2022     Lab Results   Component Value Date    LDLF 65 11/21/2022     Lab Results   Component Value Date    VLDL 10 11/21/2022     Lab Results   Component Value Date    TRIG 52 11/21/2022     Lab Results   Component Value Date    HGBA1C 5.4 10/23/2020     Lab Results   Component Value Date    TESTF 1.8 (L) 04/12/2021     Lab Results   Component Value Date    HCT 45.2 10/15/2024   UA neg  PVR 0    Assessment/Plan:   Federico Thorpe presents with     Doing well, very happy with AUS  Cont fu with Dr Sepulveda  this summer    See me as needed

## 2025-06-25 ENCOUNTER — TELEPHONE (OUTPATIENT)
Dept: UROLOGY | Facility: CLINIC | Age: 70
End: 2025-06-25
Payer: COMMERCIAL

## 2025-06-25 NOTE — TELEPHONE ENCOUNTER
Attempted to contact patient to move appt for 07/07/25 with Dr. Pettit to schedule with Dr. Samayoa. Left voicemail with office phone number and business hours. Asked patient to call us back.

## 2025-06-30 ENCOUNTER — TELEPHONE (OUTPATIENT)
Dept: CARDIOLOGY | Facility: HOSPITAL | Age: 70
End: 2025-06-30
Payer: COMMERCIAL

## 2025-07-07 ENCOUNTER — APPOINTMENT (OUTPATIENT)
Dept: UROLOGY | Facility: CLINIC | Age: 70
End: 2025-07-07
Payer: COMMERCIAL

## 2025-07-29 ENCOUNTER — OFFICE VISIT (OUTPATIENT)
Dept: CARDIOLOGY | Facility: HOSPITAL | Age: 70
End: 2025-07-29
Payer: COMMERCIAL

## 2025-07-29 VITALS
DIASTOLIC BLOOD PRESSURE: 74 MMHG | OXYGEN SATURATION: 98 % | HEART RATE: 80 BPM | BODY MASS INDEX: 40.31 KG/M2 | WEIGHT: 253.53 LBS | SYSTOLIC BLOOD PRESSURE: 134 MMHG

## 2025-07-29 DIAGNOSIS — I25.118 ATHEROSCLEROTIC HEART DISEASE OF NATIVE CORONARY ARTERY WITH OTHER FORMS OF ANGINA PECTORIS: Primary | ICD-10-CM

## 2025-07-29 DIAGNOSIS — I10 PRIMARY HYPERTENSION: ICD-10-CM

## 2025-07-29 LAB
ATRIAL RATE: 77 BPM
P AXIS: 37 DEGREES
P OFFSET: 198 MS
P ONSET: 139 MS
PR INTERVAL: 154 MS
Q ONSET: 216 MS
QRS COUNT: 13 BEATS
QRS DURATION: 126 MS
QT INTERVAL: 390 MS
QTC CALCULATION(BAZETT): 441 MS
QTC FREDERICIA: 423 MS
R AXIS: 9 DEGREES
T AXIS: 30 DEGREES
T OFFSET: 411 MS
VENTRICULAR RATE: 77 BPM

## 2025-07-29 PROCEDURE — 3075F SYST BP GE 130 - 139MM HG: CPT | Performed by: NURSE PRACTITIONER

## 2025-07-29 PROCEDURE — 99202 OFFICE O/P NEW SF 15 MIN: CPT

## 2025-07-29 PROCEDURE — 1159F MED LIST DOCD IN RCRD: CPT | Performed by: NURSE PRACTITIONER

## 2025-07-29 PROCEDURE — 99204 OFFICE O/P NEW MOD 45 MIN: CPT | Performed by: NURSE PRACTITIONER

## 2025-07-29 PROCEDURE — 93005 ELECTROCARDIOGRAM TRACING: CPT | Performed by: NURSE PRACTITIONER

## 2025-07-29 PROCEDURE — 3078F DIAST BP <80 MM HG: CPT | Performed by: NURSE PRACTITIONER

## 2025-07-29 RX ORDER — AMINOPHYLLINE 25 MG/ML
125 INJECTION, SOLUTION INTRAVENOUS ONCE AS NEEDED
OUTPATIENT
Start: 2025-07-29

## 2025-07-29 RX ORDER — DICLOFENAC SODIUM 75 MG/1
75 TABLET, DELAYED RELEASE ORAL 2 TIMES DAILY
COMMUNITY

## 2025-07-29 RX ORDER — REGADENOSON 0.08 MG/ML
0.4 INJECTION, SOLUTION INTRAVENOUS
OUTPATIENT
Start: 2025-07-29

## 2025-07-29 RX ORDER — AMLODIPINE BESYLATE 5 MG/1
TABLET ORAL DAILY
COMMUNITY

## 2025-07-29 RX ORDER — LISINOPRIL AND HYDROCHLOROTHIAZIDE 12.5; 2 MG/1; MG/1
2 TABLET ORAL DAILY
Qty: 180 TABLET | Refills: 3 | Status: SHIPPED | OUTPATIENT
Start: 2025-07-29 | End: 2026-07-29

## 2025-07-29 NOTE — PROGRESS NOTES
Referred by Milton Rodriguez NP for htn, murmur      History Of Present Illness:    Federico Thorpe is a 70 y.o. male from home with h/o prostate cancer 2007 s/p prostatectomy and radiation therapy in 2020, htn, hld, arthritis, pedal edema presenting today to the [facility] for evaluation of murmur as well as uncontrolled htn.  He denies having chest pain or pressure. Does report feeling SOB with exertion. SOB has been progressing over the past ~2 years.  He does have to stop in his driveway to rest and catch his breath; also needs to stop due to hip arthritis.  Has chronic LE edema for many years; not currently exacerbated.  Denies orthopnea or abdominal bloating. Denies having palpitations, dizziness, or syncope.       Past Medical History:  He has a past medical history of Arachnoid cyst (07/06/2021), Arthritis (01/23/2023), Bilateral edema of lower extremity (01/23/2023), Bradycardia, Chronic pain disorder, ED (erectile dysfunction), Hallux valgus with bunions (01/23/2023), Hyperlipidemia, Hypertension, Macular degeneration, Obesity (BMI 30.0-34.9), Obesity, unspecified (05/04/2022), Persistent dry cough (03/07/2023), PONV (postoperative nausea and vomiting), Prostate cancer (Multi), Sleep apnea, Stress incontinence, male, and Vitamin D deficiency.    Past Surgical History:  He has a past surgical history that includes Colonoscopy (04/16/2020); Ankle fracture surgery (Right, 1984); and Prostate surgery (2010).      Social History:  He reports that he has never smoked. He has never used smokeless tobacco. He reports that he does not currently use alcohol. He reports that he does not use drugs.    Family History:  Family History[1]     Allergies:  Patient has no known allergies.    Outpatient Medications:  Current Outpatient Medications   Medication Instructions    acetaminophen (TYLENOL) 1,000 mg, Every 6 hours PRN    acetaminophen (TYLENOL) 650 mg, oral, Every 6 hours PRN    amLODIPine  (Norvasc) 5 mg tablet Daily    atorvastatin (LIPITOR) 40 mg, oral, Daily    cholecalciferol (Vitamin D-3) 25 MCG (1000 UT) tablet 1 tablet, Daily    diclofenac (VOLTAREN) 75 mg, 2 times daily    DULoxetine (CYMBALTA) 60 mg, Daily    lisinopriL-hydrochlorothiazide 20-12.5 mg tablet 2 tablets, oral, Daily    MULTIVITAMIN ORAL No dose, route, or frequency recorded.    oxyCODONE (ROXICODONE) 5 mg, oral, Every 6 hours PRN    tadalafil (CIALIS) 5 mg, oral, Daily    TURMERIC ORAL 1 capsule, 3 times daily    vit C-E-cupric-zinc-lutein (PreserVision Lutein) 226-90-0.8-5 mg capsule capsule TAKE AS DIRECTED.        Last Recorded Vitals:  Vitals:    07/29/25 1431   BP: 134/74   Pulse: 80   SpO2: 98%   Weight: 115 kg (253 lb 8.5 oz)       Physical Exam:  Constitutional: Pleasant, Awake/Alert/Oriented to person place and time.  Head: Atraumatic, Normocephalic  Eyes: EOMI. ERIN  Neck: No JVD  Cardiovascular: Regular rate and rhythm, S1, S2. No extra heart sounds or murmurs  Respiratory: Clear to auscultation bilaterally. No wheezing, rales or rhonchi.   Abdomen: Soft, Nontender. Bowel sounds appreciated  Musculoskeletal: ROM intact. Muscle strength grossly intact upper and lower extremities 5/5.   Neurological: CNII-XII intact. Sensation grossly intact  Extremities: Warm and dry. No acute rashes and lesions. Trace pitting edema BLE   Psychiatric: Appropriate mood and affect       Last Labs:  CBC -  Lab Results   Component Value Date    WBC 6.0 10/15/2024    HGB 15.0 10/15/2024    HCT 45.2 10/15/2024    MCV 95 10/15/2024     10/15/2024       CMP -  Lab Results   Component Value Date    CALCIUM 9.2 10/15/2024    PROT 6.5 11/21/2022    ALBUMIN 4.2 11/21/2022    AST 19 11/21/2022    ALT 19 11/21/2022    ALKPHOS 56 11/21/2022    BILITOT 0.6 11/21/2022       LIPID PANEL -   Lab Results   Component Value Date    CHOL 130 11/21/2022    TRIG 52 11/21/2022    HDL 54.2 11/21/2022    CHHDL 2.4 11/21/2022    LDLF 65 11/21/2022    VLDL  10 11/21/2022       RENAL FUNCTION PANEL -   Lab Results   Component Value Date    GLUCOSE 62 (L) 10/15/2024     10/15/2024    K 4.3 10/15/2024     10/15/2024    CO2 31 10/15/2024    ANIONGAP 10 10/15/2024    BUN 15 10/15/2024    CREATININE 0.78 10/15/2024    GFRMALE >90 03/24/2023    CALCIUM 9.2 10/15/2024    ALBUMIN 4.2 11/21/2022        Lab Results   Component Value Date    BNP 24 07/12/2022    HGBA1C 5.4 10/23/2020       Last Cardiology Tests:  ECG:  ECG today: NSR, HR 77bpm with PACs     Echo:  4/28/2021 Echo:  CONCLUSIONS:   1. The left ventricular systolic function is hyperdynamic with a 65-70% estimated ejection fraction.   2. There is trace-mild mitral and tricuspid regurgitation.   3. The estimated pulmonary artery pressure is mildly elevated with the RVSP at 40.5 mmHg.      Lab review: I have personally reviewed the laboratory result(s)   Diagnostic review: I have personally reviewed the result(s) of the Echocardiogram and ECG     Assessment/Plan   Very pleasant 70 y.o. male from home with h/o prostate cancer 2007 s/p prostatectomy and radiation therapy in 2020, htn, hld, arthritis, pedal edema presenting today to the [facility] for evaluation of murmur as well as uncontrolled htn.    Plan:  -Recommend obtaining an echocardiogram for evaluation of mechanical and structural function. I am not able to appreciate a murmur on exam today which leads me to suspect may be a flow related murmur auscultated by PCP, however, echo will given more definitive evaluation.    -Recommend obtaining a pharmacological nuclear stress test for evaluation of stress induced ischemia given h/o htn, hld with progressive dyspnea on exertion that may be an anginal equivalent symptom.  -Will also obtain CT coronary calcium study for further ASCVD risk stratification  -Increase lisinopril-hydrochlorothiazide to 40-25mg daily for goal BP <140/90  -Continue amlodipine 5mg daily  -Continue  atorvastatin 40mg daily  -Consider adding aspirin 81mg daily pending results of above testing.   -Follow up pending test results, however, will minimally arrange for 6 month follow up for BP reassessment.       Gilda Nelson, APRN-CNP       [1]   Family History  Problem Relation Name Age of Onset    Heart disease Mother      Hypertension Father      Heart disease Father

## 2025-07-31 ENCOUNTER — APPOINTMENT (OUTPATIENT)
Dept: UROLOGY | Facility: HOSPITAL | Age: 70
End: 2025-07-31
Payer: COMMERCIAL

## 2025-07-31 DIAGNOSIS — R39.9 LOWER URINARY TRACT SYMPTOMS (LUTS): ICD-10-CM

## 2025-07-31 PROCEDURE — 99214 OFFICE O/P EST MOD 30 MIN: CPT | Performed by: UROLOGY

## 2025-07-31 PROCEDURE — G2211 COMPLEX E/M VISIT ADD ON: HCPCS | Performed by: UROLOGY

## 2025-07-31 PROCEDURE — 1159F MED LIST DOCD IN RCRD: CPT | Performed by: UROLOGY

## 2025-07-31 PROCEDURE — 51798 US URINE CAPACITY MEASURE: CPT | Performed by: UROLOGY

## 2025-07-31 PROCEDURE — 81003 URINALYSIS AUTO W/O SCOPE: CPT | Performed by: UROLOGY

## 2025-07-31 NOTE — PROGRESS NOTES
"FUV     HISTORY OF PRESENT ILLNESS:   Federico Thorpe is a 70 y.o. male who is being seen today for fuv    prostate cancer, was diagnosed in 2008 by Dr. Brasher, for which he underwent a prostatectomy, GG1, Jeff score 3+3=6, on path. He completed XRT, and received one ADT injection for a BCR years later. PSA undetectable. F/U stress urinary incontinence, tried oxybutynin for 2 weeks, but stopped due to dizziness.     PSA UDT (5/31/25)    Pt had AUS on 10/23/24   cuff with InhibiZone, 4.0-cm cuff; 61-70 cm of water pressure-regulating balloons placed into the right submuscular space; pump in the right  hemiscrotum; 24-mL fluid in the pressure-regulating balloon     12/12/24 - Doing well.  No f/c.  No significant pain. Activated device    3/13/25 - \"I love it\" Wears a safety pad but rarely needs it.      7/31/25 - Urinating well, minimal leakage    Past Medical History:   Diagnosis Date    Arachnoid cyst 07/06/2021    Stable per head CT    Arthritis 01/23/2023    Bilateral edema of lower extremity 01/23/2023    on HCTZ, compression stockings    Bradycardia     resolved after stopping metoprolol    Chronic pain disorder     long history of back pain    ED (erectile dysfunction)     Hallux valgus with bunions 01/23/2023    Hyperlipidemia     Hypertension     Macular degeneration     mild    Obesity (BMI 30.0-34.9)     Obesity, unspecified 05/04/2022    Class 2 obesity with body mass index (BMI) of 37.0 to 37.9 in adult    Persistent dry cough 03/07/2023    PONV (postoperative nausea and vomiting)     Prostate cancer (Multi)     radiation compleyed 2/2020    Sleep apnea     setting= 12.5, uses nightly    Stress incontinence, male     Vitamin D deficiency      Past Surgical History:   Procedure Laterality Date    ANKLE FRACTURE SURGERY Right 1984    COLONOSCOPY  04/16/2020    PROSTATE SURGERY  2010    Prostate Surgery     Social History     Tobacco Use    Smoking status: Never    Smokeless tobacco: Never    Tobacco " comments:     never smoker, little etoh , no illicit drugs    Substance Use Topics    Alcohol use: Not Currently     Family History   Problem Relation Name Age of Onset    Heart disease Mother      Hypertension Father      Heart disease Father       Current Outpatient Medications on File Prior to Visit   Medication Sig Dispense Refill    acetaminophen (Tylenol) 325 mg tablet Take 2 tablets (650 mg) by mouth every 6 hours if needed for mild pain (1 - 3). 56 tablet 0    acetaminophen (Tylenol) 500 mg tablet Take 2 tablets (1,000 mg) by mouth every 6 hours if needed for mild pain (1 - 3).      amLODIPine (Norvasc) 5 mg tablet Take by mouth once daily.      atorvastatin (Lipitor) 40 mg tablet Take 1 tablet (40 mg) by mouth once daily. 90 tablet 3    cholecalciferol (Vitamin D-3) 25 MCG (1000 UT) tablet Take 1 tablet (25 mcg) by mouth once daily.      diclofenac (Voltaren) 75 mg EC tablet Take 1 tablet (75 mg) by mouth 2 times a day. Do not crush, chew, or split.      DULoxetine (Cymbalta) 60 mg DR capsule Take 1 capsule (60 mg) by mouth once daily.      lisinopriL-hydrochlorothiazide 20-12.5 mg tablet Take 2 tablets by mouth once daily. 180 tablet 3    MULTIVITAMIN ORAL       oxyCODONE (Roxicodone) 5 mg immediate release tablet Take 1 tablet (5 mg) by mouth every 6 hours if needed for severe pain (7 - 10). 8 tablet 0    tadalafil (Cialis) 5 mg tablet Take 1 tablet (5 mg) by mouth once daily. 30 tablet 11    TURMERIC ORAL Take 1 capsule by mouth 3 times a day.      vit C-E-cupric-zinc-lutein (PreserVision Lutein) 226-90-0.8-5 mg capsule capsule TAKE AS DIRECTED.      [DISCONTINUED] lisinopriL-hydrochlorothiazide 20-12.5 mg tablet Take 2 tablets by mouth once daily.       No current facility-administered medications on file prior to visit.     Federico has no known allergies.     Review of Systems    14 point ROS reviewed and discussed with the patient. Pertinent positives/negatives discussed in the History of Present  Illness (HPI).    FH:  Prostate CA: Yes- self   Bladder CA: No  Kidney CA: No  Stones: No    Social History  Occupation:    Tob: No  Etoh: Yes- not very often     Objective:   Physical Exam   1. Constitutional: NAD, Well-developed, Well-nourished  2. Respiratory: Unlabored breathing, no audible wheezes, no use of accessory muscles   3. Cardiovascular: No JVD, extremities perfused, no edema  4. Abdomen: Soft, non-tender, non-distended, no masses or hernia.  No CVA tenderness.    5. Skin: no visible lesions, plaque, rashes, jaundice  6. Neuro: Gait normal, no focal neurologic deficit  7. Psychiatric: Mood and affect normal and appropriate, alert and oriented  . Pump in right hemiscrotum    Labs  Lab Results   Component Value Date    TESTOSTERONE 436 01/15/2022    TESTOSTERONE <60 (L) 10/14/2021    TESTOSTERONE <60 (L) 07/17/2021     Lab Results   Component Value Date    PSA <0.10 07/10/2024     Lab Results   Component Value Date    GFRMALE >90 03/24/2023     Lab Results   Component Value Date    CREATININE 0.78 10/15/2024     Lab Results   Component Value Date    CHOL 130 11/21/2022     Lab Results   Component Value Date    HDL 54.2 11/21/2022     Lab Results   Component Value Date    CHHDL 2.4 11/21/2022     Lab Results   Component Value Date    LDLF 65 11/21/2022     Lab Results   Component Value Date    VLDL 10 11/21/2022     Lab Results   Component Value Date    TRIG 52 11/21/2022     Lab Results   Component Value Date    HGBA1C 5.4 10/23/2020     Lab Results   Component Value Date    TESTF 1.8 (L) 04/12/2021     Lab Results   Component Value Date    HCT 45.2 10/15/2024   UA neg  PVR3    Assessment/Plan:   Federico Thorpe presents with     Doing well, very happy with AUS  PSA UDT    FU yearly

## 2025-08-05 DIAGNOSIS — C61 MALIGNANT NEOPLASM OF PROSTATE (MULTI): ICD-10-CM

## 2025-08-05 RX ORDER — TADALAFIL 5 MG/1
5 TABLET ORAL DAILY
Qty: 30 TABLET | Refills: 11 | Status: SHIPPED | OUTPATIENT
Start: 2025-08-05 | End: 2026-08-05

## 2025-08-20 ENCOUNTER — HOSPITAL ENCOUNTER (OUTPATIENT)
Dept: RADIOLOGY | Facility: HOSPITAL | Age: 70
Discharge: HOME | End: 2025-08-20
Payer: MEDICARE

## 2025-08-20 ENCOUNTER — RESULTS FOLLOW-UP (OUTPATIENT)
Dept: CARDIOLOGY | Facility: HOSPITAL | Age: 70
End: 2025-08-20

## 2025-08-20 ENCOUNTER — HOSPITAL ENCOUNTER (OUTPATIENT)
Dept: CARDIOLOGY | Facility: HOSPITAL | Age: 70
Discharge: HOME | End: 2025-08-20
Payer: MEDICARE

## 2025-08-20 DIAGNOSIS — I25.118 ATHEROSCLEROTIC HEART DISEASE OF NATIVE CORONARY ARTERY WITH OTHER FORMS OF ANGINA PECTORIS: ICD-10-CM

## 2025-08-20 DIAGNOSIS — I10 PRIMARY HYPERTENSION: ICD-10-CM

## 2025-08-20 LAB
AORTIC VALVE MEAN GRADIENT: 4 MMHG
AORTIC VALVE PEAK VELOCITY: 1.53 M/S
AV PEAK GRADIENT: 9 MMHG
AVA (PEAK VEL): 2.29 CM2
AVA (VTI): 2.48 CM2
EJECTION FRACTION APICAL 4 CHAMBER: 47.7
EJECTION FRACTION: 58 %
LEFT ATRIUM VOLUME AREA LENGTH INDEX BSA: 28.4 ML/M2
LEFT VENTRICLE INTERNAL DIMENSION DIASTOLE: 5.11 CM (ref 3.5–6)
LEFT VENTRICULAR OUTFLOW TRACT DIAMETER: 2.1 CM
MITRAL VALVE E/A RATIO: 0.85
RIGHT VENTRICLE FREE WALL PEAK S': 17.7 CM/S
RIGHT VENTRICLE PEAK SYSTOLIC PRESSURE: 25 MMHG
TRICUSPID ANNULAR PLANE SYSTOLIC EXCURSION: 1.2 CM

## 2025-08-20 PROCEDURE — 93017 CV STRESS TEST TRACING ONLY: CPT

## 2025-08-20 PROCEDURE — 3430000001 HC RX 343 DIAGNOSTIC RADIOPHARMACEUTICALS: Performed by: NURSE PRACTITIONER

## 2025-08-20 PROCEDURE — 2500000004 HC RX 250 GENERAL PHARMACY W/ HCPCS (ALT 636 FOR OP/ED): Performed by: NURSE PRACTITIONER

## 2025-08-20 PROCEDURE — 93018 CV STRESS TEST I&R ONLY: CPT | Performed by: INTERNAL MEDICINE

## 2025-08-20 PROCEDURE — C8929 TTE W OR WO FOL WCON,DOPPLER: HCPCS

## 2025-08-20 PROCEDURE — 93016 CV STRESS TEST SUPVJ ONLY: CPT | Performed by: INTERNAL MEDICINE

## 2025-08-20 PROCEDURE — 78452 HT MUSCLE IMAGE SPECT MULT: CPT

## 2025-08-20 PROCEDURE — A9502 TC99M TETROFOSMIN: HCPCS | Performed by: NURSE PRACTITIONER

## 2025-08-20 PROCEDURE — 78452 HT MUSCLE IMAGE SPECT MULT: CPT | Performed by: INTERNAL MEDICINE

## 2025-08-20 PROCEDURE — 93306 TTE W/DOPPLER COMPLETE: CPT | Performed by: INTERNAL MEDICINE

## 2025-08-20 RX ORDER — REGADENOSON 0.08 MG/ML
0.4 INJECTION, SOLUTION INTRAVENOUS
Status: COMPLETED | OUTPATIENT
Start: 2025-08-20 | End: 2025-08-20

## 2025-08-20 RX ADMIN — PERFLUTREN 2 ML OF DILUTION: 6.52 INJECTION, SUSPENSION INTRAVENOUS at 08:25

## 2025-08-20 RX ADMIN — TETROFOSMIN 33.8 MILLICURIE: 0.23 INJECTION, POWDER, LYOPHILIZED, FOR SOLUTION INTRAVENOUS at 09:17

## 2025-08-20 RX ADMIN — REGADENOSON 0.4 MG: 0.08 INJECTION, SOLUTION INTRAVENOUS at 09:27

## 2025-08-20 RX ADMIN — TETROFOSMIN 10.7 MILLICURIE: 0.23 INJECTION, POWDER, LYOPHILIZED, FOR SOLUTION INTRAVENOUS at 08:04

## 2026-08-03 ENCOUNTER — APPOINTMENT (OUTPATIENT)
Dept: UROLOGY | Facility: HOSPITAL | Age: 71
End: 2026-08-03
Payer: COMMERCIAL

## (undated) DEVICE — TOWEL, SURGICAL, NEURO, O/R, 16 X 26, BLUE, STERILE

## (undated) DEVICE — DRESSING, ISLAND, TELFA, 4 X 5 IN

## (undated) DEVICE — SUTURE, PDS II, 2-0, 27 IN, SH, VIOLET

## (undated) DEVICE — TRAY, FOLEY, LUBRI-SIL, 16FR, COMPLETE CARE W/STATLOCK

## (undated) DEVICE — Device

## (undated) DEVICE — DRESSING, TRANSPARENT, TEGADERM, 4 X 4-3/4 IN

## (undated) DEVICE — COLLECTION BAG, FLUID, NON-STERILE

## (undated) DEVICE — DRESSING, GAUZE, SPONGE, KERLIX, SUPER, 6 X 6.75 IN, STERILE 10PK

## (undated) DEVICE — TUBING, SUCTION, CONNECTING, STERILE 0.25 X 120 IN., LF

## (undated) DEVICE — KIT, MINOR, DOUBLE BASIN

## (undated) DEVICE — DRAPE, INSTRUMENT, W/POUCH, STERI DRAPE, 7 X 11 IN, DISPOSABLE, STERILE

## (undated) DEVICE — SYRINGE, 30 CC, LUER LOCK

## (undated) DEVICE — CATHETER, IV, INSYTE, AUTOGUARD, SHIELDED, 18 G X 1.88 IN, VIALON

## (undated) DEVICE — RESERVOIR, DRAINAGE, WOUND, JACKSON-PRATT, 100 CC, SILICONE

## (undated) DEVICE — WOUND SYSTEM, DEBRIDEMENT & CLEANING, O.R DUOPAK

## (undated) DEVICE — PAD, GROUNDING, ELECTROSURGICAL, W/9 FT CABLE, POLYHESIVE II, ADULT, LF

## (undated) DEVICE — RETRACTION SYSTEM, SKW DEEP SCROTAL

## (undated) DEVICE — SUTURE, VICRYL, 3-0, 27 IN, SH

## (undated) DEVICE — SUTURE, ETHILON, 3-0, 18 IN, PS1, BLACK

## (undated) DEVICE — SYRINGE, 10 CC, LUER LOCK

## (undated) DEVICE — ADHESIVE, SKIN, DERMABOND ADVANCED, 15CM, PEN-STYLE

## (undated) DEVICE — GLOVE, SURGEON, PREMIERPRO PI, MICRO, SZ-7.5, PF, WH

## (undated) DEVICE — COVER, TABLE, 54X90

## (undated) DEVICE — DRESSING, GAUZE, FLUFF, 1 PLY, 18 X 36 IN

## (undated) DEVICE — DRESSING, GAUZE, 16 PLY, 4 X 4 IN, STERILE

## (undated) DEVICE — SOLUTION, IRRIGATION, SODIUM CHLORIDE 0.9%, 1000 ML, POUR BOTTLE

## (undated) DEVICE — LOOP, VESSEL, MAXI, RED

## (undated) DEVICE — SUPPORTER, ATHLETIC, ADULT, LARGE

## (undated) DEVICE — GLOVE, SURGEON, PREMIERPRO PI, SZ-7.5, PF, WH

## (undated) DEVICE — TOWEL PACK, STERILE, 4/PACK, BLUE

## (undated) DEVICE — MARKER, SKIN, DUAL TIP INK W/9 LABEL AND REMOVABLE TIME OUT SLEEVE

## (undated) DEVICE — SUTURE, VICRYL, 2-0, 27 IN, BR/SH 27, VIOLET